# Patient Record
Sex: FEMALE | Race: BLACK OR AFRICAN AMERICAN | Employment: FULL TIME | URBAN - METROPOLITAN AREA
[De-identification: names, ages, dates, MRNs, and addresses within clinical notes are randomized per-mention and may not be internally consistent; named-entity substitution may affect disease eponyms.]

---

## 2017-05-24 ENCOUNTER — HOSPITAL ENCOUNTER (EMERGENCY)
Age: 37
Discharge: HOME | End: 2017-05-24
Attending: EMERGENCY MEDICINE
Payer: SELF-PAY

## 2017-05-24 VITALS
BODY MASS INDEX: 30.53 KG/M2 | RESPIRATION RATE: 15 BRPM | WEIGHT: 190 LBS | TEMPERATURE: 98.6 F | HEART RATE: 92 BPM | DIASTOLIC BLOOD PRESSURE: 80 MMHG | OXYGEN SATURATION: 100 % | HEIGHT: 66 IN | SYSTOLIC BLOOD PRESSURE: 144 MMHG

## 2017-05-24 DIAGNOSIS — R60.0 PEDAL EDEMA: ICD-10-CM

## 2017-05-24 DIAGNOSIS — G89.29 CHRONIC BILATERAL LOW BACK PAIN WITHOUT SCIATICA: Primary | ICD-10-CM

## 2017-05-24 DIAGNOSIS — M54.50 CHRONIC BILATERAL LOW BACK PAIN WITHOUT SCIATICA: Primary | ICD-10-CM

## 2017-05-24 DIAGNOSIS — D64.9 ANEMIA, UNSPECIFIED TYPE: ICD-10-CM

## 2017-05-24 LAB
ANION GAP BLD CALC-SCNC: 18 MMOL/L (ref 10–20)
BUN BLD-MCNC: 9 MG/DL (ref 7–18)
CA-I BLD-MCNC: 1.26 MMOL/L (ref 1.12–1.32)
CHLORIDE BLD-SCNC: 105 MMOL/L (ref 100–108)
CO2 BLD-SCNC: 25 MMOL/L (ref 19–24)
CREAT UR-MCNC: 0.6 MG/DL (ref 0.6–1.3)
GLUCOSE BLD STRIP.AUTO-MCNC: 86 MG/DL (ref 74–106)
HCT VFR BLD CALC: 25 % (ref 36–49)
HGB BLD-MCNC: 8.5 G/DL (ref 12–16)
POTASSIUM BLD-SCNC: 3.4 MMOL/L (ref 3.5–5.5)
SODIUM BLD-SCNC: 143 MMOL/L (ref 136–145)

## 2017-05-24 PROCEDURE — 80047 BASIC METABLC PNL IONIZED CA: CPT

## 2017-05-24 PROCEDURE — 99282 EMERGENCY DEPT VISIT SF MDM: CPT

## 2017-05-24 NOTE — DISCHARGE INSTRUCTIONS
Anemia: Care Instructions  Your Care Instructions    Anemia is a low level of red blood cells, which carry oxygen throughout your body. Many things can cause anemia. Lack of iron is one of the most common causes. Your body needs iron to make hemoglobin, a substance in red blood cells that carries oxygen from the lungs to your body's cells. Without enough iron, the body produces fewer and smaller red blood cells. As a result, your body's cells do not get enough oxygen, and you feel tired and weak. And you may have trouble concentrating. Bleeding is the most common cause of a lack of iron. You may have heavy menstrual bleeding or bleeding caused by conditions such as ulcers, hemorrhoids, or cancer. Regular use of aspirin or other anti-inflammatory medicines (such as ibuprofen) also can cause bleeding in some people. A lack of iron in your diet also can cause anemia, especially at times when the body needs more iron, such as during pregnancy, infancy, and the teen years. Your doctor may have prescribed iron pills. It may take several months of treatment for your iron levels to return to normal. Your doctor also may suggest that you eat foods that are rich in iron, such as meat and beans. There are many other causes of anemia. It is not always due to a lack of iron. Finding the specific cause of your anemia will help your doctor find the right treatment for you. Follow-up care is a key part of your treatment and safety. Be sure to make and go to all appointments, and call your doctor if you are having problems. It's also a good idea to know your test results and keep a list of the medicines you take. How can you care for yourself at home? · Take your medicines exactly as prescribed. Call your doctor if you think you are having a problem with your medicine. · If your doctor recommends iron pills, take them as directed:  ¨ Try to take the pills on an empty stomach about 1 hour before or 2 hours after meals. But you may need to take iron with food to avoid an upset stomach. ¨ Do not take antacids or drink milk or caffeine drinks (such as coffee, tea, or cola) at the same time or within 2 hours of the time that you take your iron. They can make it hard for your body to absorb the iron. ¨ Vitamin C (from food or supplements) helps your body absorb iron. Try taking iron pills with a glass of orange juice or some other food that is high in vitamin C, such as citrus fruits. ¨ Iron pills may cause stomach problems, such as heartburn, nausea, diarrhea, constipation, and cramps. Be sure to drink plenty of fluids, and include fruits, vegetables, and fiber in your diet each day. Iron pills often make your bowel movements dark or green. ¨ If you forget to take an iron pill, do not take a double dose of iron the next time you take a pill. ¨ Keep iron pills out of the reach of small children. An overdose of iron can be very dangerous. · Follow your doctor's advice about eating iron-rich foods. These include red meat, shellfish, poultry, eggs, beans, raisins, whole-grain bread, and leafy green vegetables. · Steam vegetables to help them keep their iron content. When should you call for help? Call 911 anytime you think you may need emergency care. For example, call if:  · You have symptoms of a heart attack. These may include:  ¨ Chest pain or pressure, or a strange feeling in the chest.  ¨ Sweating. ¨ Shortness of breath. ¨ Nausea or vomiting. ¨ Pain, pressure, or a strange feeling in the back, neck, jaw, or upper belly or in one or both shoulders or arms. ¨ Lightheadedness or sudden weakness. ¨ A fast or irregular heartbeat. After you call 911, the  may tell you to chew 1 adult-strength or 2 to 4 low-dose aspirin. Wait for an ambulance. Do not try to drive yourself. · You passed out (lost consciousness).   Call your doctor now or seek immediate medical care if:  · You have new or increased shortness of breath. · You are dizzy or lightheaded, or you feel like you may faint. · Your fatigue and weakness continue or get worse. · You have any abnormal bleeding, such as:  ¨ Nosebleeds. ¨ Vaginal bleeding that is different (heavier, more frequent, at a different time of the month) than what you are used to. ¨ Bloody or black stools, or rectal bleeding. ¨ Bloody or pink urine. Watch closely for changes in your health, and be sure to contact your doctor if:  · You do not get better as expected. Where can you learn more? Go to http://[employer]kervin.info/. Enter R301 in the search box to learn more about \"Anemia: Care Instructions. \"  Current as of: October 13, 2016  Content Version: 11.2  © 2815-8659 [employer]. Care instructions adapted under license by [employer] (which disclaims liability or warranty for this information). If you have questions about a medical condition or this instruction, always ask your healthcare professional. [place] [address] any warranty or liability for your use of this information. Back Pain: Care Instructions  Your Care Instructions    Back pain has many possible causes. It is often related to problems with muscles and ligaments of the back. It may also be related to problems with the nerves, discs, or bones of the back. Moving, lifting, standing, sitting, or sleeping in an awkward way can strain the back. Sometimes you don't notice the injury until later. Arthritis is another common cause of back pain. Although it may hurt a lot, back pain usually improves on its own within several weeks. Most people recover in 12 weeks or less. Using good home treatment and being careful not to stress your back can help you feel better sooner. Follow-up care is a key part of your treatment and safety. Be sure to make and go to all appointments, and call your doctor if you are having problems.  Its also a good idea to know your test results and keep a list of the medicines you take. How can you care for yourself at home? · Sit or lie in positions that are most comfortable and reduce your pain. Try one of these positions when you lie down:  ¨ Lie on your back with your knees bent and supported by large pillows. ¨ Lie on the floor with your legs on the seat of a sofa or chair. Yesenia Ben on your side with your knees and hips bent and a pillow between your legs. ¨ Lie on your stomach if it does not make pain worse. · Do not sit up in bed, and avoid soft couches and twisted positions. Bed rest can help relieve pain at first, but it delays healing. Avoid bed rest after the first day of back pain. · Change positions every 30 minutes. If you must sit for long periods of time, take breaks from sitting. Get up and walk around, or lie in a comfortable position. · Try using a heating pad on a low or medium setting for 15 to 20 minutes every 2 or 3 hours. Try a warm shower in place of one session with the heating pad. · You can also try an ice pack for 10 to 15 minutes every 2 to 3 hours. Put a thin cloth between the ice pack and your skin. · Take pain medicines exactly as directed. ¨ If the doctor gave you a prescription medicine for pain, take it as prescribed. ¨ If you are not taking a prescription pain medicine, ask your doctor if you can take an over-the-counter medicine. · Take short walks several times a day. You can start with 5 to 10 minutes, 3 or 4 times a day, and work up to longer walks. Walk on level surfaces and avoid hills and stairs until your back is better. · Return to work and other activities as soon as you can. Continued rest without activity is usually not good for your back. · To prevent future back pain, do exercises to stretch and strengthen your back and stomach. Learn how to use good posture, safe lifting techniques, and proper body mechanics. When should you call for help?   Call your doctor now or seek immediate medical care if:  · You have new or worsening numbness in your legs. · You have new or worsening weakness in your legs. (This could make it hard to stand up.)  · You lose control of your bladder or bowels. Watch closely for changes in your health, and be sure to contact your doctor if:  · Your pain gets worse. · You are not getting better after 2 weeks. Where can you learn more? Go to http://chiki-kervin.info/. Enter D015 in the search box to learn more about \"Back Pain: Care Instructions. \"  Current as of: May 23, 2016  Content Version: 11.2  © 0626-5338 [employer]. Care instructions adapted under license by [employer] (which disclaims liability or warranty for this information). If you have questions about a medical condition or this instruction, always ask your healthcare professional. [address] any warranty or liability for your use of this information. Back Stretches: Exercises  Your Care Instructions  Here are some examples of exercises for stretching your back. Start each exercise slowly. Ease off the exercise if you start to have pain. Your doctor or physical therapist will tell you when you can start these exercises and which ones will work best for you. How to do the exercises  Overhead stretch    1. Stand comfortably with your feet shoulder-width apart. 2. Looking straight ahead, raise both arms over your head and reach toward the ceiling. Do not allow your head to tilt back. 3. Hold for 15 to 30 seconds, then lower your arms to your sides. 4. Repeat 2 to 4 times. Side stretch    1. Stand comfortably with your feet shoulder-width apart. 2. Raise one arm over your head, and then lean to the other side. 3. Slide your hand down your leg as you let the weight of your arm gently stretch your side muscles. Hold for 15 to 30 seconds. 4. Repeat 2 to 4 times on each side. Press-up    1.  Lie on your stomach, supporting your body with your forearms. 2. Press your elbows down into the floor to raise your upper back. As you do this, relax your stomach muscles and allow your back to arch without using your back muscles. As your press up, do not let your hips or pelvis come off the floor. 3. Hold for 15 to 30 seconds, then relax. 4. Repeat 2 to 4 times. Relax and rest    1. Lie on your back with a rolled towel under your neck and a pillow under your knees. Extend your arms comfortably to your sides. 2. Relax and breathe normally. 3. Remain in this position for about 10 minutes. 4. If you can, do this 2 or 3 times each day. Follow-up care is a key part of your treatment and safety. Be sure to make and go to all appointments, and call your doctor if you are having problems. It's also a good idea to know your test results and keep a list of the medicines you take. Where can you learn more? Go to http://chiki-kervin.info/. Enter D938 in the search box to learn more about \"Back Stretches: Exercises. \"  Current as of: May 23, 2016  Content Version: 11.2  © 0269-9739 [employer]. Care instructions adapted under license by [employer] (which disclaims liability or warranty for this information). If you have questions about a medical condition or this instruction, always ask your healthcare professional. [place] [address] any warranty or liability for your use of this information. Back Pain, Emergency or Urgent Symptoms: Care Instructions  Your Care Instructions  Many people have back pain at one time or another. In most cases, pain gets better with self-care that includes over-the-counter pain medicine, ice, heat, and exercises. Unless you have symptoms of a severe injury or heart attack, you may be able to give yourself a few days before you call a doctor. But some back problems are very serious. Do not ignore symptoms that need to be checked right away.   Follow-up care is a key part of your treatment and safety. Be sure to make and go to all appointments, and call your doctor if you are having problems. It's also a good idea to know your test results and keep a list of the medicines you take. How can you care for yourself at home? · Sit or lie in positions that are most comfortable and that reduce your pain. Try one of these positions when you lie down:  ¨ Lie on your back with your knees bent and supported by large pillows. ¨ Lie on the floor with your legs on the seat of a sofa or chair. Fidel Coral on your side with your knees and hips bent and a pillow between your legs. ¨ Lie on your stomach if it does not make pain worse. · Do not sit up in bed, and avoid soft couches and twisted positions. Bed rest can help relieve pain at first, but it delays healing. Avoid bed rest after the first day. · Change positions every 30 minutes. If you must sit for long periods of time, take breaks from sitting. Get up and walk around, or lie flat. · Try using a heating pad on a low or medium setting, for 15 to 20 minutes every 2 or 3 hours. Try a warm shower in place of one session with the heating pad. You can also buy single-use heat wraps that last up to 8 hours. You can also try ice or cold packs on your back for 10 to 20 minutes at a time, several times a day. (Put a thin cloth between the ice pack and your skin.) This reduces pain and makes it easier to be active and exercise. · Take pain medicines exactly as directed. ¨ If the doctor gave you a prescription medicine for pain, take it as prescribed. ¨ If you are not taking a prescription pain medicine, ask your doctor if you can take an over-the-counter medicine. When should you call for help? Call 911 anytime you think you may need emergency care. For example, call if:  · You are unable to move a leg at all. · You have back pain with severe belly pain. · You have symptoms of a heart attack.  These may include:  ¨ Chest pain or pressure, or a strange feeling in the chest.  ¨ Sweating. ¨ Shortness of breath. ¨ Nausea or vomiting. ¨ Pain, pressure, or a strange feeling in the back, neck, jaw, or upper belly or in one or both shoulders or arms. ¨ Lightheadedness or sudden weakness. ¨ A fast or irregular heartbeat. After you call 911, the  may tell you to chew 1 adult-strength or 2 to 4 low-dose aspirin. Wait for an ambulance. Do not try to drive yourself. Call your doctor now or seek immediate medical care if:  · You have new or worse symptoms in your arms, legs, chest, belly, or buttocks. Symptoms may include:  ¨ Numbness or tingling. ¨ Weakness. ¨ Pain. · You lose bladder or bowel control. · You have back pain and:  ¨ You have injured your back while lifting or doing some other activity. Call if the pain is severe, has not gone away after 1 or 2 days, and you cannot do your normal daily activities. ¨ You have had a back injury before that needed treatment. ¨ Your pain has lasted longer than 4 weeks. ¨ You have had weight loss you cannot explain. ¨ You are age 48 or older. ¨ You have cancer now or have had it before. Watch closely for changes in your health, and be sure to contact your doctor if you are not getting better as expected. Where can you learn more? Go to http://chiki-kervin.info/. Enter D662 in the search box to learn more about \"Back Pain, Emergency or Urgent Symptoms: Care Instructions. \"  Current as of: May 27, 2016  Content Version: 11.2  © 4713-1397 [employer]. Care instructions adapted under license by [employer] (which disclaims liability or warranty for this information). If you have questions about a medical condition or this instruction, always ask your healthcare professional. [address] any warranty or liability for your use of this information.

## 2017-05-24 NOTE — Clinical Note
Drink plenty of fluids. Eat a diet rich in iron supplements. Elevate your feet and legs when possible. Consider wearing compression socks during the day when you are standing for prolonged periods of time. Have your blood pressure rechecked by a [facility] doctor this week.

## 2017-05-24 NOTE — ED PROVIDER NOTES
HPI Comments: Pt with no significant medical history, presents to ED with complaint of low back pain for the past 2 years and intermittent bilateral pedal edema for the past year with occasional swelling and \"my feet feel numb\". She denies any history of DM, no history of neuropathy, no weakness, no change in gait, no swelling to lower extremities, no dyspnea, no wheezing, no cough. She does not have a PMD and hasn't previously sought medical care for her chronic complaints. She states that her feet were too numb to work today which is what prompted her to come to the ED. No other acute symptoms or complaints were noted. Past Medical History:   Diagnosis Date    Anemia        History reviewed. No pertinent surgical history. History reviewed. No pertinent family history. Social History     Social History    Marital status:      Spouse name: N/A    Number of children: N/A    Years of education: N/A     Occupational History    Not on file. Social History Main Topics    Smoking status: Never Smoker    Smokeless tobacco: Not on file    Alcohol use No    Drug use: No    Sexual activity: Not on file     Other Topics Concern    Not on file     Social History Narrative         ALLERGIES: Review of patient's allergies indicates no known allergies. Review of Systems   Constitutional: Negative for chills, diaphoresis and fever. HENT: Negative. Eyes: Negative for visual disturbance. Respiratory: Negative for chest tightness and shortness of breath. Cardiovascular: Negative for chest pain, palpitations and leg swelling. Gastrointestinal: Negative for abdominal pain, nausea and vomiting. Endocrine: Negative. Genitourinary: Negative for dysuria and hematuria. Musculoskeletal: Positive for back pain. Negative for gait problem, neck pain and neck stiffness. Skin: Negative for pallor and wound. Allergic/Immunologic: Negative. Neurological: Positive for numbness. Negative for dizziness, syncope, weakness, light-headedness and headaches. Hematological: Does not bruise/bleed easily. Vitals:    05/24/17 1723   BP: 144/80   Pulse: 92   Resp: 15   Temp: 98.6 °F (37 °C)   SpO2: 100%   Weight: 86.2 kg (190 lb)   Height: 5' 6\" (1.676 m)            Physical Exam   Constitutional: She is oriented to person, place, and time. She appears well-developed and well-nourished. No distress. Resting comfortably on stretcher   HENT:   Head: Normocephalic and atraumatic. MM moist   Eyes: Conjunctivae and EOM are normal. No scleral icterus. Sclera clear bilaterally   Neck: Normal range of motion. Neck supple. No JVD present. Non-tender to palpation   Cardiovascular: Normal rate, regular rhythm and normal heart sounds. Exam reveals no gallop and no friction rub. No murmur heard. Pulmonary/Chest: Effort normal and breath sounds normal. No respiratory distress. She has no wheezes. She has no rales. She exhibits no tenderness. No crepitance with palpation   Abdominal: Soft. Bowel sounds are normal. She exhibits no distension. There is no tenderness. Genitourinary:   Genitourinary Comments: No CVA tenderness   Musculoskeletal: She exhibits no tenderness. Normal inspection of upper extremities. Soft, non-pitting edema to bilateral ankles, no lower extremity pain or swelling. Lymphadenopathy:     She has no cervical adenopathy. Neurological: She is alert and oriented to person, place, and time. No cranial nerve deficit. She exhibits normal muscle tone. Normal motor and sensation bilaterally to upper and lower extremities   Skin: Skin is warm and dry. She is not diaphoretic. Psychiatric:   Normal mood and affect. Vitals reviewed. MDM  Number of Diagnoses or Management Options  Diagnosis management comments: Pt with chronic complaints, benign exam and reassuring VS.  Will check POC chem, consult  and discharge to home with PMD referrals.     ED Course Procedures

## 2017-05-30 ENCOUNTER — HOSPITAL ENCOUNTER (EMERGENCY)
Age: 37
Discharge: LEFT BEFORE DISCHARGE | End: 2017-05-30
Attending: EMERGENCY MEDICINE
Payer: SELF-PAY

## 2017-05-30 VITALS
TEMPERATURE: 98.6 F | RESPIRATION RATE: 20 BRPM | BODY MASS INDEX: 30.53 KG/M2 | WEIGHT: 190 LBS | OXYGEN SATURATION: 100 % | SYSTOLIC BLOOD PRESSURE: 130 MMHG | HEART RATE: 102 BPM | HEIGHT: 66 IN | DIASTOLIC BLOOD PRESSURE: 88 MMHG

## 2017-05-30 DIAGNOSIS — Z53.21 PATIENT LEFT WITHOUT BEING SEEN: Primary | ICD-10-CM

## 2017-05-30 PROCEDURE — 75810000275 HC EMERGENCY DEPT VISIT NO LEVEL OF CARE

## 2017-05-31 NOTE — ED PROVIDER NOTES
HPI Comments: Pt left without being seen after triage. Patient is a 40 y.o. female presenting with abdominal pain, vomiting, and dizziness. Abdominal Pain    Associated symptoms include vomiting. Vomiting    Associated symptoms include abdominal pain. Dizziness    Associated symptoms include abdominal pain, vomiting and dizziness. Past Medical History:   Diagnosis Date    Anemia        History reviewed. No pertinent surgical history. History reviewed. No pertinent family history. Social History     Social History    Marital status:      Spouse name: N/A    Number of children: N/A    Years of education: N/A     Occupational History    Not on file. Social History Main Topics    Smoking status: Never Smoker    Smokeless tobacco: Not on file    Alcohol use No    Drug use: No    Sexual activity: Not on file     Other Topics Concern    Not on file     Social History Narrative         ALLERGIES: Review of patient's allergies indicates no known allergies. Review of Systems   Gastrointestinal: Positive for abdominal pain and vomiting. Neurological: Positive for dizziness.        Vitals:    05/30/17 1855   BP: 130/88   Pulse: (!) 102   Resp: 20   Temp: 98.6 °F (37 °C)   SpO2: 100%   Weight: 86.2 kg (190 lb)   Height: 5' 6\" (1.676 m)            Physical Exam     MDM  ED Course       Procedures

## 2018-03-11 ENCOUNTER — HOSPITAL ENCOUNTER (INPATIENT)
Age: 38
LOS: 1 days | Discharge: HOME | DRG: 812 | End: 2018-03-12
Attending: EMERGENCY MEDICINE | Admitting: INTERNAL MEDICINE
Payer: SELF-PAY

## 2018-03-11 ENCOUNTER — APPOINTMENT (OUTPATIENT)
Dept: ULTRASOUND IMAGING | Age: 38
DRG: 812 | End: 2018-03-11
Attending: EMERGENCY MEDICINE
Payer: SELF-PAY

## 2018-03-11 DIAGNOSIS — R51.9 ACUTE NONINTRACTABLE HEADACHE, UNSPECIFIED HEADACHE TYPE: ICD-10-CM

## 2018-03-11 DIAGNOSIS — G89.29 CHRONIC BILATERAL LOW BACK PAIN WITHOUT SCIATICA: ICD-10-CM

## 2018-03-11 DIAGNOSIS — M54.50 CHRONIC BILATERAL LOW BACK PAIN WITHOUT SCIATICA: ICD-10-CM

## 2018-03-11 DIAGNOSIS — R42 DIZZINESS: Primary | ICD-10-CM

## 2018-03-11 DIAGNOSIS — D64.9 SYMPTOMATIC ANEMIA: ICD-10-CM

## 2018-03-11 PROBLEM — N92.0 MENORRHAGIA WITH REGULAR CYCLE: Status: ACTIVE | Noted: 2018-03-11

## 2018-03-11 LAB
ANION GAP SERPL CALC-SCNC: 5 MMOL/L (ref 3–18)
ATRIAL RATE: 71 BPM
BASOPHILS # BLD: 0 K/UL (ref 0–0.06)
BASOPHILS NFR BLD: 1 % (ref 0–2)
BUN SERPL-MCNC: 9 MG/DL (ref 7–18)
BUN/CREAT SERPL: 14 (ref 12–20)
CALCIUM SERPL-MCNC: 8.9 MG/DL (ref 8.5–10.1)
CALCULATED P AXIS, ECG09: 54 DEGREES
CALCULATED R AXIS, ECG10: 39 DEGREES
CALCULATED T AXIS, ECG11: 21 DEGREES
CHLORIDE SERPL-SCNC: 112 MMOL/L (ref 100–108)
CK MB CFR SERPL CALC: NORMAL % (ref 0–4)
CK MB SERPL-MCNC: <1 NG/ML (ref 5–25)
CK SERPL-CCNC: 77 U/L (ref 26–192)
CO2 SERPL-SCNC: 24 MMOL/L (ref 21–32)
CREAT SERPL-MCNC: 0.65 MG/DL (ref 0.6–1.3)
DIAGNOSIS, 93000: NORMAL
DIFFERENTIAL METHOD BLD: ABNORMAL
EOSINOPHIL # BLD: 0.1 K/UL (ref 0–0.4)
EOSINOPHIL NFR BLD: 1 % (ref 0–5)
ERYTHROCYTE [DISTWIDTH] IN BLOOD BY AUTOMATED COUNT: 21.8 % (ref 11.6–14.5)
GLUCOSE SERPL-MCNC: 85 MG/DL (ref 74–99)
HCT VFR BLD AUTO: 22.4 % (ref 35–45)
HGB BLD-MCNC: 6.9 G/DL (ref 12–16)
LYMPHOCYTES # BLD: 2.1 K/UL (ref 0.9–3.6)
LYMPHOCYTES NFR BLD: 34 % (ref 21–52)
MCH RBC QN AUTO: 16.2 PG (ref 24–34)
MCHC RBC AUTO-ENTMCNC: 30.8 G/DL (ref 31–37)
MCV RBC AUTO: 52.7 FL (ref 74–97)
MONOCYTES # BLD: 0.7 K/UL (ref 0.05–1.2)
MONOCYTES NFR BLD: 12 % (ref 3–10)
NEUTS SEG # BLD: 3.4 K/UL (ref 1.8–8)
NEUTS SEG NFR BLD: 52 % (ref 40–73)
P-R INTERVAL, ECG05: 164 MS
PLATELET # BLD AUTO: 379 K/UL (ref 135–420)
POTASSIUM SERPL-SCNC: 3.9 MMOL/L (ref 3.5–5.5)
Q-T INTERVAL, ECG07: 398 MS
QRS DURATION, ECG06: 80 MS
QTC CALCULATION (BEZET), ECG08: 432 MS
RBC # BLD AUTO: 4.25 M/UL (ref 4.2–5.3)
SODIUM SERPL-SCNC: 141 MMOL/L (ref 136–145)
TROPONIN I SERPL-MCNC: <0.02 NG/ML (ref 0–0.04)
VENTRICULAR RATE, ECG03: 71 BPM
WBC # BLD AUTO: 6.4 K/UL (ref 4.6–13.2)

## 2018-03-11 PROCEDURE — 36430 TRANSFUSION BLD/BLD COMPNT: CPT

## 2018-03-11 PROCEDURE — 99283 EMERGENCY DEPT VISIT LOW MDM: CPT

## 2018-03-11 PROCEDURE — 30233N1 TRANSFUSION OF NONAUTOLOGOUS RED BLOOD CELLS INTO PERIPHERAL VEIN, PERCUTANEOUS APPROACH: ICD-10-PCS | Performed by: INTERNAL MEDICINE

## 2018-03-11 PROCEDURE — P9016 RBC LEUKOCYTES REDUCED: HCPCS | Performed by: EMERGENCY MEDICINE

## 2018-03-11 PROCEDURE — 74011250637 HC RX REV CODE- 250/637: Performed by: INTERNAL MEDICINE

## 2018-03-11 PROCEDURE — 36415 COLL VENOUS BLD VENIPUNCTURE: CPT | Performed by: EMERGENCY MEDICINE

## 2018-03-11 PROCEDURE — 74011250636 HC RX REV CODE- 250/636: Performed by: EMERGENCY MEDICINE

## 2018-03-11 PROCEDURE — 86900 BLOOD TYPING SEROLOGIC ABO: CPT | Performed by: EMERGENCY MEDICINE

## 2018-03-11 PROCEDURE — 82550 ASSAY OF CK (CPK): CPT | Performed by: EMERGENCY MEDICINE

## 2018-03-11 PROCEDURE — 85027 COMPLETE CBC AUTOMATED: CPT | Performed by: EMERGENCY MEDICINE

## 2018-03-11 PROCEDURE — 80048 BASIC METABOLIC PNL TOTAL CA: CPT | Performed by: EMERGENCY MEDICINE

## 2018-03-11 PROCEDURE — 86920 COMPATIBILITY TEST SPIN: CPT | Performed by: EMERGENCY MEDICINE

## 2018-03-11 PROCEDURE — 93005 ELECTROCARDIOGRAM TRACING: CPT

## 2018-03-11 PROCEDURE — 99218 HC RM OBSERVATION: CPT

## 2018-03-11 PROCEDURE — 74011250636 HC RX REV CODE- 250/636: Performed by: INTERNAL MEDICINE

## 2018-03-11 PROCEDURE — 76830 TRANSVAGINAL US NON-OB: CPT

## 2018-03-11 RX ORDER — LANOLIN ALCOHOL/MO/W.PET/CERES
1 CREAM (GRAM) TOPICAL
Status: DISCONTINUED | OUTPATIENT
Start: 2018-03-12 | End: 2018-03-12 | Stop reason: HOSPADM

## 2018-03-11 RX ORDER — KETOROLAC TROMETHAMINE 30 MG/ML
30 INJECTION, SOLUTION INTRAMUSCULAR; INTRAVENOUS
Status: DISCONTINUED | OUTPATIENT
Start: 2018-03-11 | End: 2018-03-11

## 2018-03-11 RX ORDER — ONDANSETRON 2 MG/ML
4 INJECTION INTRAMUSCULAR; INTRAVENOUS
Status: DISCONTINUED | OUTPATIENT
Start: 2018-03-11 | End: 2018-03-12 | Stop reason: HOSPADM

## 2018-03-11 RX ORDER — DOCUSATE SODIUM 100 MG/1
100 CAPSULE, LIQUID FILLED ORAL
Status: DISCONTINUED | OUTPATIENT
Start: 2018-03-11 | End: 2018-03-12 | Stop reason: HOSPADM

## 2018-03-11 RX ORDER — NALOXONE HYDROCHLORIDE 0.4 MG/ML
0.4 INJECTION, SOLUTION INTRAMUSCULAR; INTRAVENOUS; SUBCUTANEOUS AS NEEDED
Status: DISCONTINUED | OUTPATIENT
Start: 2018-03-11 | End: 2018-03-12 | Stop reason: HOSPADM

## 2018-03-11 RX ORDER — ACETAMINOPHEN 325 MG/1
650 TABLET ORAL
Status: DISCONTINUED | OUTPATIENT
Start: 2018-03-11 | End: 2018-03-12 | Stop reason: HOSPADM

## 2018-03-11 RX ORDER — SODIUM CHLORIDE 9 MG/ML
150 INJECTION, SOLUTION INTRAVENOUS CONTINUOUS
Status: DISCONTINUED | OUTPATIENT
Start: 2018-03-11 | End: 2018-03-12 | Stop reason: HOSPADM

## 2018-03-11 RX ORDER — SODIUM CHLORIDE 9 MG/ML
250 INJECTION, SOLUTION INTRAVENOUS AS NEEDED
Status: DISCONTINUED | OUTPATIENT
Start: 2018-03-11 | End: 2018-03-12 | Stop reason: HOSPADM

## 2018-03-11 RX ORDER — OXYCODONE AND ACETAMINOPHEN 5; 325 MG/1; MG/1
1 TABLET ORAL
Status: DISCONTINUED | OUTPATIENT
Start: 2018-03-11 | End: 2018-03-12 | Stop reason: HOSPADM

## 2018-03-11 RX ADMIN — SODIUM CHLORIDE 150 ML/HR: 900 INJECTION, SOLUTION INTRAVENOUS at 20:44

## 2018-03-11 RX ADMIN — SODIUM CHLORIDE 150 ML/HR: 900 INJECTION, SOLUTION INTRAVENOUS at 19:13

## 2018-03-11 RX ADMIN — ACETAMINOPHEN 650 MG: 325 TABLET ORAL at 20:50

## 2018-03-11 RX ADMIN — SODIUM CHLORIDE 1000 ML: 900 INJECTION, SOLUTION INTRAVENOUS at 19:30

## 2018-03-11 NOTE — IP AVS SNAPSHOT
[address] Patient: Karin Saldivar MRN: [id number] WNS:[birth year]05 About your hospitalization You were admitted on:  March 11, 2018 You last received care in the:  [address] [place] [address] You were discharged on:  March 12, 2018 Why you were hospitalized Your primary diagnosis was:  Symptomatic Anemia Your diagnoses also included:  Menorrhagia With Regular Cycle Follow-up Information Follow up With Details Comments Contact Info KINSEY Cano On 3/20/2018 @1000am, come in 30 minutes earlier, and bring id, proof of income, list of medications. [address] [place] [address] [address] 200 [facility] 
[phone] Your Scheduled Appointments Tuesday March 20, 2018 10:30 AM EDT New Patient with KINSEY Cano [address] [place] [address] ([facility]) [address] [place] [address] [address] 200 [facility]  
[phone] Discharge Orders None A check priyanka indicates which time of day the medication should be taken. My Medications START taking these medications Instructions Each Dose to Equal  
 Morning Noon Evening Bedtime  
 ferrous sulfate 325 mg (65 mg iron) tablet Start taking on:  3/13/2018 Your last dose was: Your next dose is: Take 1 Tab by mouth daily (with breakfast). 325 mg Where to Get Your Medications Information on where to get these meds will be given to you by the nurse or doctor. ! Ask your nurse or doctor about these medications  
  ferrous sulfate 325 mg (65 mg iron) tablet Discharge Instructions Anemia: Care Instructions Your Care Instructions Anemia is a low level of red blood cells, which carry oxygen throughout your body. Many things can cause anemia.  Lack of iron is one of the most common causes. Your body needs iron to make hemoglobin, a substance in red blood cells that carries oxygen from the lungs to your body's cells. Without enough iron, the body produces fewer and smaller red blood cells. As a result, your body's cells do not get enough oxygen, and you feel tired and weak. And you may have trouble concentrating. Bleeding is the most common cause of a lack of iron. You may have heavy menstrual bleeding or bleeding caused by conditions such as ulcers, hemorrhoids, or cancer. Regular use of aspirin or other anti-inflammatory medicines (such as ibuprofen) also can cause bleeding in some people. A lack of iron in your diet also can cause anemia, especially at times when the body needs more iron, such as during pregnancy, infancy, and the teen years. Your doctor may have prescribed iron pills. It may take several months of treatment for your iron levels to return to normal. Your doctor also may suggest that you eat foods that are rich in iron, such as meat and beans. There are many other causes of anemia. It is not always due to a lack of iron. Finding the specific cause of your anemia will help your doctor find the right treatment for you. Follow-up care is a key part of your treatment and safety. Be sure to make and go to all appointments, and call your doctor if you are having problems. It's also a good idea to know your test results and keep a list of the medicines you take. How can you care for yourself at home? · Take your medicines exactly as prescribed. Call your doctor if you think you are having a problem with your medicine. · If your doctor recommends iron pills, take them as directed: ¨ Try to take the pills on an empty stomach about 1 hour before or 2 hours after meals. But you may need to take iron with food to avoid an upset stomach.  
¨ Do not take antacids or drink milk or caffeine drinks (such as coffee, tea, or cola) at the same time or within 2 hours of the time that you take your iron. They can make it hard for your body to absorb the iron. ¨ Vitamin C (from food or supplements) helps your body absorb iron. Try taking iron pills with a glass of orange juice or some other food that is high in vitamin C, such as citrus fruits. ¨ Iron pills may cause stomach problems, such as heartburn, nausea, diarrhea, constipation, and cramps. Be sure to drink plenty of fluids, and include fruits, vegetables, and fiber in your diet each day. Iron pills often make your bowel movements dark or green. ¨ If you forget to take an iron pill, do not take a double dose of iron the next time you take a pill. ¨ Keep iron pills out of the reach of small children. An overdose of iron can be very dangerous. · Follow your doctor's advice about eating iron-rich foods. These include red meat, shellfish, poultry, eggs, beans, raisins, whole-grain bread, and leafy green vegetables. · Steam vegetables to help them keep their iron content. When should you call for help? Call 911 anytime you think you may need emergency care. For example, call if: 
? · You have symptoms of a heart attack. These may include: ¨ Chest pain or pressure, or a strange feeling in the chest. 
¨ Sweating. ¨ Shortness of breath. ¨ Nausea or vomiting. ¨ Pain, pressure, or a strange feeling in the back, neck, jaw, or upper belly or in one or both shoulders or arms. ¨ Lightheadedness or sudden weakness. ¨ A fast or irregular heartbeat. After you call 911, the  may tell you to chew 1 adult-strength or 2 to 4 low-dose aspirin. Wait for an ambulance. Do not try to drive yourself. ? · You passed out (lost consciousness). ?Call your doctor now or seek immediate medical care if: 
? · You have new or increased shortness of breath. ? · You are dizzy or lightheaded, or you feel like you may faint. ? · Your fatigue and weakness continue or get worse. ? · You have any abnormal bleeding, such as: 
¨ Nosebleeds. ¨ Vaginal bleeding that is different (heavier, more frequent, at a different time of the month) than what you are used to. ¨ Bloody or black stools, or rectal bleeding. ¨ Bloody or pink urine. ? Watch closely for changes in your health, and be sure to contact your doctor if: 
? · You do not get better as expected. Where can you learn more? Go to http://chiki-kervin.info/. Enter R301 in the search box to learn more about \"Anemia: Care Instructions. \" Current as of: October 13, 2016 Content Version: 11.4 © 0356-0656 delicious. Care instructions adapted under license by [employer] (which disclaims liability or warranty for this information). If you have questions about a medical condition or this instruction, always ask your healthcare professional. [place] [address] any warranty or liability for your use of this information. Abnormal Uterine Bleeding in Teens: Care Instructions Your Care Instructions Abnormal uterine bleeding (AUB) is irregular bleeding from the uterus that is longer or heavier than usual or does not occur at your regular time. Sometimes it's because of changes in hormone levels or growths in the uterus such as fibroids or polyps. Sometimes a cause cannot be found. You may have heavy bleeding when you are not expecting your period. Your doctor may suggest a pregnancy test, if you think you are pregnant. Follow-up care is a key part of your treatment and safety. Be sure to make and go to all appointments, and call your doctor if you are having problems. It's also a good idea to know your test results and keep a list of the medicines you take. How can you care for yourself at home? · Be safe with medicines. Read and follow all instructions on the label. ¨ If the doctor gave you a prescription medicine for pain, take it as prescribed. ¨ If you are not taking a prescription pain medicine, ask your doctor if you can take an over-the-counter medicine. · You may be low in iron because of blood loss. Eat a balanced diet that is high in iron and vitamin C. Foods with a lot of iron include red meat, shellfish, and eggs. They also include beans and leafy green vegetables. Talk to your doctor about taking iron pills or a multivitamin. When should you call for help? Call 911 anytime you think you may need emergency care. For example, call if: 
? · You passed out (lost consciousness). ?Call your doctor now or seek immediate medical care if: 
? · You have new or worse belly or pelvic pain. ? · You are dizzy or lightheaded, or you feel like you may faint. ? · You have severe vaginal bleeding. ? Watch closely for changes in your health, and be sure to contact your doctor if: 
? · You think you may be pregnant. ? · Your bleeding gets worse. ? · You do not get better as expected. Where can you learn more? Go to http://chiki-kervin.info/. Enter Sarah Hernandez in the search box to learn more about \"Abnormal Uterine Bleeding in Teens: Care Instructions. \" Current as of: October 13, 2016 Content Version: 11.4 © 1281-6169 [employer]. Care instructions adapted under license by [employer] (which disclaims liability or warranty for this information). If you have questions about a medical condition or this instruction, always ask your healthcare professional. [place] [address] any warranty or liability for your use of this information. Abnormal Uterine Bleeding: Care Instructions Your Care Instructions Abnormal uterine bleeding (AUB) is irregular bleeding from the uterus that is longer or heavier than usual or does not occur at your regular time. Sometimes it is caused by changes in hormone levels.  It can also be caused by growths in the uterus, such as fibroids or polyps. Sometimes a cause cannot be found. You may have heavy bleeding when you are not expecting your period. Your doctor may suggest a pregnancy test, if you think you are pregnant. Follow-up care is a key part of your treatment and safety. Be sure to make and go to all appointments, and call your doctor if you are having problems. It's also a good idea to know your test results and keep a list of the medicines you take. How can you care for yourself at home? · Be safe with medicines. Take pain medicines exactly as directed. ¨ If the doctor gave you a prescription medicine for pain, take it as prescribed. ¨ If you are not taking a prescription pain medicine, ask your doctor if you can take an over-the-counter medicine. · You may be low in iron because of blood loss. Eat a balanced diet that is high in iron and vitamin C. Foods rich in iron include red meat, shellfish, eggs, beans, and leafy green vegetables. Talk to your doctor about whether you need to take iron pills or a multivitamin. When should you call for help? Call 911 anytime you think you may need emergency care. For example, call if: 
? · You passed out (lost consciousness). ?Call your doctor now or seek immediate medical care if: 
? · You have new or worse belly or pelvic pain. ? · You have severe vaginal bleeding. ? · You feel dizzy or lightheaded, or you feel like you may faint. ? Watch closely for changes in your health, and be sure to contact your doctor if: 
? · You think you may be pregnant. ? · Your bleeding gets worse. ? · You do not get better as expected. Where can you learn more? Go to http://chiki-kervin.info/. Enter S363 in the search box to learn more about \"Abnormal Uterine Bleeding: Care Instructions. \" Current as of: October 13, 2016 Content Version: 11.4 © 0708-7194 [employer].  Care instructions adapted under license by Adama Monahan (which disclaims liability or warranty for this information). If you have questions about a medical condition or this instruction, always ask your healthcare professional. [address] any warranty or liability for your use of this information. [employer] Announcement We are excited to announce that we are making your provider's discharge notes available to you in [employer]. You will see these notes when they are completed and signed by the physician that discharged you from your recent hospital stay. If you have any questions or concerns about any information you see in [employer], please call the Health Information Department where you were seen or reach out to your Primary Care Provider for more information about your plan of care. Introducing [facility]! Gisele Baxter introduces [employer] patient portal. Now you can access parts of your medical record, email your doctor's office, and request medication refills online. 1. In your internet browser, go to [email or web address] 2. Click on the First Time User? Click Here link in the Sign In box. You will see the New Member Sign Up page. 3. Enter your [employer] Access Code exactly as it appears below. You will not need to use this code after youve completed the sign-up process. If you do not sign up before the expiration date, you must request a new code. · [employer] Access Code: [email or web address] Expires: 6/10/2018 11:36 AM 
 
4. Enter the last four digits of your Social Security Number (xxxx) and Date of Birth (mm/dd/yyyy) as indicated and click Submit. You will be taken to the next sign-up page. 5. Create a [employer] ID. This will be your [employer] login ID and cannot be changed, so think of one that is secure and easy to remember. 6. Create a [employer] password. You can change your password at any time. 7. Enter your Password Reset Question and Answer. This can be used at a later time if you forget your password. 8. Enter your e-mail address. You will receive e-mail notification when new information is available in [address] 9. Click Sign Up. You can now view and download portions of your medical record. 10. Click the Download Summary menu link to download a portable copy of your medical information. If you have questions, please visit the Frequently Asked Questions section of the [employer] website. Remember, [employer] is NOT to be used for urgent needs. For medical emergencies, dial 911. Now available from your iPhone and Android! Providers Seen During Your Hospitalization Provider Specialty Primary office phone Mckenzie Bowman MD Emergency Medicine [phone] Davey Maher MD Hospitalist [phone] Layla Zurita MD Internal Medicine [phone] Immunizations Administered for This Admission Name Date Influenza Vaccine (Quad) PF 3/12/2018 Your Primary Care Physician (PCP) Primary Care Physician Office Phone Office Fax NONE ** None ** ** None ** You are allergic to the following No active allergies Recent Documentation Weight BMI OB Status Smoking Status 95 kg 33.81 kg/m2 Having regular periods Never Smoker Emergency Contacts Name Discharge Info Relation Home Work Mobile [address] CAREGIVER [3] Spouse [3] [phone] Patient Belongings The following personal items are in your possession at time of discharge: 
  Dental Appliances: None         Home Medications: None   Jewelry: None  Clothing: At bedside, Jacket/Coat, Pants, Shirt, Socks, Undergarments    Other Valuables: Diego Vergara Please provide this summary of care documentation to your next provider.  
  
  
 
  
Signatures-by signing, you are acknowledging that this After Visit Summary has been reviewed with you and you have received a copy. Patient Signature:  ____________________________________________________________ Date:  ____________________________________________________________  
  
Merleen Spire Provider Signature:  ____________________________________________________________ Date:  ____________________________________________________________

## 2018-03-11 NOTE — H&P
History & Physical    Patient: Alona Martel MRN: [id number]  CSN: [id number]    YOB: 1980  Age: 41 y.o. Sex: female      DOA: 3/11/2018    Chief Complaint:   Chief Complaint   Patient presents with    Back Pain    Dizziness    Blurred Vision          HPI:     Alona Martel is a 41 y.o.  female who has PMH of anemia and heavy menses presented to ER with a CC of generalized weakness, dizziness and SOB for the last 3 months that got progressively worse in the last few days. Pt reports heavy menses since menarche but did not seek medical care. Her cycles ar regular but last 7 days with heavy bleeding in the first 5 days. On admission Pt was found to have low H/H and on her last 2 days of her cycle with mild bleeding. Denies abdominal pain except for regular menstrual cramps. No CP but feels general weak and has no energy    Past Medical History:   Diagnosis Date    Anemia        No past surgical history on file. No family history on file. Social History     Social History    Marital status:      Spouse name: N/A    Number of children: N/A    Years of education: N/A     Social History Main Topics    Smoking status: Never Smoker    Smokeless tobacco: None    Alcohol use No    Drug use: No    Sexual activity: Not Asked     Other Topics Concern    None     Social History Narrative       Prior to Admission medications    Not on File       No Known Allergies      Review of Systems  GENERAL: Patient alert, awake and oriented times 3, able to communicate full sentences and not in distress at rest  HEENT: No change in vision, no earache, tinnitus, sore throat or sinus congestion. NECK: No pain or stiffness. PULMONARY: No shortness of breath, cough or wheeze. Cardiovascular: no pnd / orthopnea, no CP  GASTROINTESTINAL: No abdominal pain, nausea, vomiting or diarrhea, melena or bright red blood per rectum.    GENITOURINARY: No urinary frequency, urgency, hesitancy or dysuria. MUSCULOSKELETAL: No joint or muscle pain, no back pain, no recent trauma. DERMATOLOGIC: No rash, no itching, no lesions. ENDOCRINE: No polyuria, polydipsia, no heat or cold intolerance. No recent change in weight. HEMATOLOGICAL: +ve anemia. No easy bruising or bleeding. NEUROLOGIC: No headache, seizures, +ve for numbness, tingling and  weakness. Physical Exam:     Physical Exam:  Visit Vitals    /69    Pulse 70    Temp 97 °F (36.1 °C)    Resp 16    Wt 86.2 kg (190 lb)    SpO2 100%    BMI 30.67 kg/m2           Temp (24hrs), Av °F (36.7 °C), Min:97 °F (36.1 °C), Max:99.2 °F (37.3 °C)             General:  Alert, cooperative, no distress, appears stated age. Head: Normocephalic, without obvious abnormality, atraumatic. Eyes:  Conjunctivae/corneas clear. PERRL, EOMs intact. Nose: Nares normal. No drainage or sinus tenderness. Neck: Supple, symmetrical, trachea midline, no adenopathy, thyroid: no enlargement, no carotid bruit and no JVD. Lungs:   Clear to auscultation bilaterally. Heart:  Regular rate and rhythm, S1, S2 normal.     Abdomen: Soft, non-tender. Bowel sounds normal.    Extremities: Extremities normal, atraumatic, no cyanosis or edema. Pulses: 2+ and symmetric all extremities. Skin:  No rashes or lesions   Neurologic: AAOx3, No focal motor or sensory deficit. Labs Reviewed: All lab results for the last 24 hours reviewed.   Transvaginal US and EKG    Procedures/imaging: see electronic medical records for all procedures/Xrays and details which were not copied into this note but were reviewed prior to creation of Plan      Assessment/Plan     Principal Problem:    Symptomatic anemia (3/11/2018)    Active Problems:    Menorrhagia with regular cycle (3/11/2018)       Pt is admitted to observation for Symptomatic anemia most likely 2 ry to menorrhagia r/o other etiology    Transfusing 2 units of PRBCs  Monitor H&H  Will get Iron profile and ferritin  B12 and folate levels  TSH and retics count    Hx of Menorrhagia and anteverted uterus on US>> GYN consult is appreciated. Pt will f/u as an OP for further work up if needed.     DVT/GI Prophylaxis: SCD's    Plan of care is discussed in details with Patient/Family at bedside and agreed upon    Dave Nash MD  3/12/2018 5:53 PM

## 2018-03-11 NOTE — ED NOTES
Patient refusing to be stuck again. She did have an IV that infiltrated. She was stuck x3 and is refusing to have another stick.

## 2018-03-11 NOTE — ED PROVIDER NOTES
[facility] EMERGENCY DEPT      12:49 PM    Date: 3/11/2018  Patient Name: Jarod Beckman    History of Presenting Illness     Chief Complaint   Patient presents with    Back Pain    Dizziness    Blurred Vision       History Provided By: Patient    Chief Complaint: dizziness  Duration:  Days  Timing:  Intermittent  Location: n/a  Quality: n/a  Severity: Moderate  Modifying Factors: Worsened when standing/walking. Associated Symptoms: back pain, blurred vision, HA, weight loss, decreased appetite    41 y.o. female with noted past medical history of anemia who presents to the emergency department with intermittent dizziness onset 1 week ago. Associated Sx include back pain, intermittent HA, unexplained weight loss, decreased appetite, and visual disturbance. The pt reports that the dizziness only occurs when she is upright, walking or standing. The pt states that she experienced an episode of blurred vision yesterday, along with an episode of dizziness while at work. Reports that the HA is located in her forehead. States that the back pain is chronic and located in her lower back, but radiates to her neck. Denies any alcohol use, tobacco use, congestion, chance of pregnancy, or rhinorrhea. Denies any other modifying factors. No further complaints at this time. Nursing notes regarding the HPI and triage nursing notes were reviewed. Prior medical records were reviewed. Past History     Past Medical History:  Past Medical History:   Diagnosis Date    Anemia        Past Surgical History:  No past surgical history on file. Family History:  No family history on file.     Social History:  Social History   Substance Use Topics    Smoking status: Never Smoker    Smokeless tobacco: Not on file    Alcohol use No       Allergies:  No Known Allergies    Patient's primary care provider (as noted in EPIC):  None    Review of Systems   Review of Systems   Constitutional: Positive for appetite change and unexpected weight change. Negative for diaphoresis. HENT: Negative for congestion and rhinorrhea. Eyes: Positive for visual disturbance. Negative for discharge. Respiratory: Negative for stridor. Cardiovascular: Negative for palpitations. Gastrointestinal: Negative for diarrhea. Genitourinary: Negative for flank pain. Musculoskeletal: Positive for back pain. Neurological: Positive for dizziness and headaches. Negative for weakness. Psychiatric/Behavioral: Negative for hallucinations. All other systems reviewed and are negative. Physical Exam       Visit Vitals    /83    Pulse 79    Temp 99.2 °F (37.3 °C)    Resp 16    Wt 86.2 kg (190 lb)    SpO2 100%    BMI 30.67 kg/m2       PHYSICAL EXAM:    CONSTITUTIONAL:  Alert, in no apparent distress;  well developed;  well nourished. HEAD:  Normocephalic, atraumatic. EYES:  EOMI. Non-icteric sclera. Normal conjunctiva. ENTM:  Nose:  no rhinorrhea. Throat:  no erythema or exudate, mucous membranes moist.  NECK:  No JVD. Supple  RESPIRATORY:  Chest clear, equal breath sounds, good air movement. CARDIOVASCULAR:  Regular rate and rhythm. No murmurs, rubs, or gallops. GI:  Normal bowel sounds, abdomen soft and non-tender. No rebound or guarding. Rectal exam: Good rectal tone, Hemoccult negative. BACK:  Non-tender. UPPER EXT:  Normal inspection. LOWER EXT:  No edema, no calf tenderness. Distal pulses intact. NEURO:  Moves all four extremities. Normal motor exam and sensation in all four extremities. Normal CN II-XII exam.  Normal bilateral finger-to-nose exam.      SKIN:  No rashes;  Normal for age. PSYCH:  Alert and normal affect.     DIFFERENTIAL DIAGNOSES/ MEDICAL DECISION MAKING:   Dehydration, hyperglycemia-induced weakness, electrolyte and/or endocrine imbalance, CVA, intracranial hemorrhage, sepsis, cardiac arrhythmia, central versus peripheral vertigo, illicit drug intoxication, alcohol intoxication, prescribed drug toxicity, pregnancy in females patients, anxiety disorder, versus other etiologies or a combination of the above.       ED COURSE:      Diagnostic Study Results     Abnormal lab results from this emergency department encounter:  Labs Reviewed   METABOLIC PANEL, BASIC - Abnormal; Notable for the following:        Result Value    Chloride 112 (*)     All other components within normal limits   CBC W/O DIFF - Abnormal; Notable for the following:     HGB 6.9 (*)     HCT 22.4 (*)     MCV 52.7 (*)     MCH 16.2 (*)     MCHC 30.8 (*)     RDW 21.8 (*)     All other components within normal limits   DIFFERENTIAL, AUTO - Abnormal; Notable for the following:     MONOCYTES 12 (*)     All other components within normal limits   CARDIAC PANEL,(CK, CKMB & TROPONIN)       Lab values for this patient within approximately the last 12 hours:  Recent Results (from the past 12 hour(s))   EKG, 12 LEAD, INITIAL    Collection Time: 03/11/18 11:56 AM   Result Value Ref Range    Ventricular Rate 71 BPM    Atrial Rate 71 BPM    P-R Interval 164 ms    QRS Duration 80 ms    Q-T Interval 398 ms    QTC Calculation (Bezet) 432 ms    Calculated P Axis 54 degrees    Calculated R Axis 39 degrees    Calculated T Axis 21 degrees    Diagnosis       Normal sinus rhythm  Normal ECG  No previous ECGs available  Confirmed by Simone Rodgers MD, ----- ([id number]) on 3/11/2018 82:84:65 PM     METABOLIC PANEL, BASIC    Collection Time: 03/11/18 12:23 PM   Result Value Ref Range    Sodium 141 136 - 145 mmol/L    Potassium 3.9 3.5 - 5.5 mmol/L    Chloride 112 (H) 100 - 108 mmol/L    CO2 24 21 - 32 mmol/L    Anion gap 5 3.0 - 18 mmol/L    Glucose 85 74 - 99 mg/dL    BUN 9 7.0 - 18 MG/DL    Creatinine 0.65 0.6 - 1.3 MG/DL    BUN/Creatinine ratio 14 12 - 20      GFR est AA >60 >60 ml/min/1.73m2    GFR est non-AA >60 >60 ml/min/1.73m2    Calcium 8.9 8.5 - 10.1 MG/DL   CBC W/O DIFF    Collection Time: 03/11/18 12:23 PM   Result Value Ref Range    WBC 6.4 4.6 - 13.2 K/uL    RBC 4.25 4.20 - 5.30 M/uL    HGB 6.9 (L) 12.0 - 16.0 g/dL    HCT 22.4 (L) 35.0 - 45.0 %    MCV 52.7 (L) 74.0 - 97.0 FL    MCH 16.2 (L) 24.0 - 34.0 PG    MCHC 30.8 (L) 31.0 - 37.0 g/dL    RDW 21.8 (H) 11.6 - 14.5 %    PLATELET 480 559 - 028 K/uL   CARDIAC PANEL,(CK, CKMB & TROPONIN)    Collection Time: 03/11/18 12:23 PM   Result Value Ref Range    CK 77 26 - 192 U/L    CK - MB <1.0 <3.6 ng/ml    CK-MB Index  0.0 - 4.0 %     CALCULATION NOT PERFORMED WHEN RESULT IS BELOW LINEAR LIMIT    Troponin-I, Qt. <0.02 0.0 - 0.045 NG/ML   DIFFERENTIAL, AUTO    Collection Time: 03/11/18 12:23 PM   Result Value Ref Range    NEUTROPHILS 52 40 - 73 %    LYMPHOCYTES 34 21 - 52 %    MONOCYTES 12 (H) 3 - 10 %    EOSINOPHILS 1 0 - 5 %    BASOPHILS 1 0 - 2 %    ABS. NEUTROPHILS 3.4 1.8 - 8.0 K/UL    ABS. LYMPHOCYTES 2.1 0.9 - 3.6 K/UL    ABS. MONOCYTES 0.7 0.05 - 1.2 K/UL    ABS. EOSINOPHILS 0.1 0.0 - 0.4 K/UL    ABS. BASOPHILS 0.0 0.0 - 0.06 K/UL    DF AUTOMATED         Radiologist and cardiologist interpretations if available at time of this note:  No results found. Medication(s) ordered for patient during this emergency visit encounter:  Medications   ketorolac (TORADOL) injection 30 mg (not administered)   sodium chloride 0.9 % bolus infusion 1,000 mL (not administered)       Medical Decision Making     I am the first provider for this patient. I reviewed the vital signs, available nursing notes, past medical history, past surgical history, family history and social history. Vital Signs:  Reviewed the patient's vital signs.     Initial EKG interpretation by attending emergency physician:  NSR about 70 bpm.    While unlikely, will assess the posterior fossa for hemorrhage with CT scan and will order routine labs seeking occult infection, metabolic derangement or evidence of anemia, acute worsening renal impairment, ACS/AMI (doubt), etc. My initial bedside impression is that this workup will likely be unremarkable and that the patient will ultimately be able to be discharged home, with instructions for further outpatient follow up and reevaluation by the patient's physician team.    IMPRESSION AND MEDICAL DECISION MAKING:  Based upon the patient's presentation with noted HPI and PE, along with the work up done in the emergency department, I believe that the patient is having dizziness of uncertain etiology. I am comfortable with discharge of the patient home with the noted prescribed medications, and outpatient follow up with the patients primary care physician. DIAGNOSIS:  1. Dizziness   2. Headache  3. Back pain  4. Symptomatic anemia. Admit to Hospitalist    The patient was presented to the accepting hospitalist, Dr. Alicja Vanegas. The patient's primary doctor is None, and admissions for this physician are with the hospitalist.  If the patient has no primary doctor, then admission is to the hospitalist as well. As the emergency physician, I wrote courtesy admission orders for the hospitalist physician. The courtesy orders included explicit instructions for the floor nursing staff to call the admitting attending physician upon patient arrival on the floor. Consult Obstetrics/Gynecology (Either Patient's or on Call Physician)    The obstetric gyncecology Aida Rosales was called and the patient was presented for obstetric gyncecology consult. I personally spoke with Dr. Josselyn Ocampo, in the obstetric gyncecology group, about the patient's presentation and management. Coding Diagnoses     Clinical Impression:   1. Dizziness    2. Acute nonintractable headache, unspecified headache type    3. Chronic bilateral low back pain without sciatica        Disposition     Disposition:  Admit. JOSE Virk Board Certified Emergency Physician    Provider Attestation:  If a scribe was utilized in generation of this patient record, I personally performed the services described in the documentation, reviewed the documentation, as recorded by the scribe in my presence, and it accurately records the patient's history of presenting illness, review of systems, patient physical examination, and procedures performed by me as the attending physician. Ritu Reid M.D. [facility] Board Certified Emergency Physician  3/11/2018.  12:49 PM    Scribe [employer] acting as a scribe for and in the presence of Morgan Clark MD      March 11, 2018 at 12:58 PM       Provider Attestation:      I personally performed the services described in the documentation, reviewed the documentation, as recorded by the scribe in my presence, and it accurately and completely records my words and actions.  March 11, 2018 at 12:58 PM - Morgan Clark MD

## 2018-03-11 NOTE — ED NOTES
Patient provided ice water after the OK from Dr. Vielka Norris. Patient is sitting comfortably on stretcher in no apparent distress, patient is on the monitor with call bell in reach, will continue to monitor.

## 2018-03-11 NOTE — IP AVS SNAPSHOT
[address] [place] [address] Jesus Velásquez Patient: Jessica Tripp MRN: [id number] VUL:[id number] A check priyanka indicates which time of day the medication should be taken. My Medications START taking these medications Instructions Each Dose to Equal  
 Morning Noon Evening Bedtime  
 ferrous sulfate 325 mg (65 mg iron) tablet Start taking on:  3/13/2018 Your last dose was: Your next dose is: Take 1 Tab by mouth daily (with breakfast). 325 mg Where to Get Your Medications Information on where to get these meds will be given to you by the nurse or doctor. ! Ask your nurse or doctor about these medications  
  ferrous sulfate 325 mg (65 mg iron) tablet

## 2018-03-11 NOTE — CONSULTS
Gynecology History and Physical    Name: Azra Mackay MRN: [id number] SSN: [id number]    YOB: 1980  Age: 41 y.o. Sex: female       Subjective:      Chief complaint: anemia    Marylen Presume is a 41 y.o. P6  female presents with 1wk of lightheadedness, and dizziness, with chest tightness. She was seen in the ED and noted to have hgb 6.9. She was admitted to the medicine service for blood transfusion and work up for anemia. Gyn consulted for h/o menorrhagia. LMP 3/5/18, pt states she is just finishing her period and only has light brown discharge. She states her periods are regular and last 7days. They are usually heavy with occasional small clots for the first 5 days. Her current period is the same. She denies dymenorrhea or h/o  Fibroids. She has head heavy periods for 17yrs. She was on depo-provera  but stopped because shes states it did not work. She has not seen a gynecologist since  after the birth of her last child. OB History      Para Term  AB Living    6 6 6   6    SAB TAB Ectopic Molar Multiple Live Births         6        Obstetric Comments    LMP 3/5/18  Periods regular, last 7d ays, flow heavy with occasional clots, nonedysmenorrhea  History of sexually transmitted infections none  Last pap unknown  Denies h.o fibroids or ovarian cyst.           Past Medical History:   Diagnosis Date    Anemia      No past surgical history on file. Social History     Occupational History    Not on file. Social History Main Topics    Smoking status: Never Smoker    Smokeless tobacco: Not on file    Alcohol use No    Drug use: No    Sexual activity: Not on file     No family history on file. No Known Allergies  Prior to Admission medications    Not on File        Review of Systems:  A comprehensive review of systems was negative except for that written in the History of Present Illness.      Objective:     Vitals:    18 1150 18 1200 03/11/18 1230 03/11/18 1637   BP: 135/83 120/84  99/64   Pulse: 79  61 61   Resp: 16  10 18   Temp: 99.2 °F (37.3 °C)   98.9 °F (37.2 °C)   SpO2: 100% 94% 100% 100%   Weight: 190 lb (86.2 kg)          Physical Exam:  Patient without distress. Heart: Regular rate and rhythm, S1S2 present or without murmur or extra heart sounds  Lung: clear to auscultation throughout lung fields, no wheezes, no rales, no rhonchi and normal respiratory effort  Abdomen: soft, nontender  Vagina: normal mucosa without prolapse or lesions and light brown discharge, no active bleeding  Cervix: normal appearance  Adnexa: normal bimanual exam  Uterus: normal single, nontender  Pelvic Ultrasound Transvaginal     CPT CODE: 77724     HISTORY: Heavy prolonged menstrual periods, anemia, vaginal bleeding. LMP  3/4/2018 and currently bleeding     COMPARISON: None.     TECHNIQUE: Images were obtained with the endovaginal probe.     FINDINGS:     The uterus is anteverted. It measures 8.7 x 5.1 x 7 cm. It is normal in size  configuration and echogenicity. The endometrial echo is well seen with the  double thickness endometrial echo measuring 0.4 cm. There is a small amount of  simple appearing fluid within the endometrial canal at the fundus. There is no  gestational sac. Myometrial echo pattern is normal.  The right ovary measures 2.6 x 2.4 x 3.4 cm. It is normal in size and  configuration. The left ovary measures 2.3 x 2.2 x 3.3 cm. It is normal in  size, configuration, and echogenicity. There is normal color flow in bilateral  spectral Doppler tracing from the ovaries. There is tiny amount of simple  appearing fluid in the cul de sac.     IMPRESSION  IMPRESSION:     Tiny amount of simple fluid in the endometrial canal.  Tiny amount of nonspecific simple fluid within the cul-de-sac        Report provided to floor at 1840 hrs.   Assessment:   38P6 with h/o menorrhagia presents with symptomatic anemia, currently stable not actively bleeding, TVUS unremarkable, AFVSS      Plan:   1. Continue care per primary  2. No further acute management needed at this time from a Gyn perspective as patient is not actively bleeding. She can follow up with Chris Lundberg for outpatient management of her menorrhagia in 2weeks.          Signed By:  Patricia Diamond MD     March 11, 2018 7:09 PM

## 2018-03-11 NOTE — ROUTINE PROCESS
TRANSFER - OUT REPORT:    Verbal report given to Irma (name) on Nicolette Ferreira  being transferred to [address] [place] [address]  (unit) for routine progression of care       Report consisted of patients Situation, Background, Assessment and   Recommendations(SBAR). Information from the following report(s) ED Summary and MAR was reviewed with the receiving nurse. Lines:   Peripheral IV 03/11/18 Right Antecubital (Active)   Site Assessment Clean, dry, & intact 3/11/2018 12:28 PM   Phlebitis Assessment 0 3/11/2018 12:28 PM   Infiltration Assessment 0 3/11/2018 12:28 PM   Dressing Status Clean, dry, & intact 3/11/2018 12:28 PM   Hub Color/Line Status Blue 3/11/2018 12:28 PM        Opportunity for questions and clarification was provided.       Patient transported with:   Registered Nurse

## 2018-03-12 VITALS
WEIGHT: 209.5 LBS | OXYGEN SATURATION: 100 % | HEART RATE: 65 BPM | TEMPERATURE: 98.7 F | SYSTOLIC BLOOD PRESSURE: 121 MMHG | RESPIRATION RATE: 20 BRPM | DIASTOLIC BLOOD PRESSURE: 78 MMHG | BODY MASS INDEX: 33.81 KG/M2

## 2018-03-12 LAB
ANION GAP SERPL CALC-SCNC: 7 MMOL/L (ref 3–18)
BUN SERPL-MCNC: 7 MG/DL (ref 7–18)
BUN/CREAT SERPL: 11 (ref 12–20)
CALCIUM SERPL-MCNC: 8.1 MG/DL (ref 8.5–10.1)
CHLORIDE SERPL-SCNC: 111 MMOL/L (ref 100–108)
CO2 SERPL-SCNC: 22 MMOL/L (ref 21–32)
CREAT SERPL-MCNC: 0.62 MG/DL (ref 0.6–1.3)
ERYTHROCYTE [DISTWIDTH] IN BLOOD BY AUTOMATED COUNT: 29.1 % (ref 11.6–14.5)
FERRITIN SERPL-MCNC: 3 NG/ML (ref 8–388)
FOLATE SERPL-MCNC: 10.8 NG/ML (ref 3.1–17.5)
GLUCOSE SERPL-MCNC: 89 MG/DL (ref 74–99)
HCT VFR BLD AUTO: 27.2 % (ref 35–45)
HGB BLD-MCNC: 8.7 G/DL (ref 12–16)
IRON SATN MFR SERPL: 15 %
IRON SERPL-MCNC: 68 UG/DL (ref 50–175)
MAGNESIUM SERPL-MCNC: 1.8 MG/DL (ref 1.6–2.6)
MCH RBC QN AUTO: 18.9 PG (ref 24–34)
MCHC RBC AUTO-ENTMCNC: 32 G/DL (ref 31–37)
MCV RBC AUTO: 59.1 FL (ref 74–97)
PHOSPHATE SERPL-MCNC: 3.7 MG/DL (ref 2.5–4.9)
PLATELET # BLD AUTO: 340 K/UL (ref 135–420)
POTASSIUM SERPL-SCNC: 3.7 MMOL/L (ref 3.5–5.5)
RBC # BLD AUTO: 4.6 M/UL (ref 4.2–5.3)
RETICS/RBC NFR AUTO: 0.8 % (ref 0.5–2.3)
SODIUM SERPL-SCNC: 140 MMOL/L (ref 136–145)
TIBC SERPL-MCNC: 456 UG/DL (ref 250–450)
TSH SERPL DL<=0.05 MIU/L-ACNC: 1.9 UIU/ML (ref 0.36–3.74)
VIT B12 SERPL-MCNC: 371 PG/ML (ref 211–911)
WBC # BLD AUTO: 5.8 K/UL (ref 4.6–13.2)

## 2018-03-12 PROCEDURE — 74011250637 HC RX REV CODE- 250/637: Performed by: INTERNAL MEDICINE

## 2018-03-12 PROCEDURE — 85045 AUTOMATED RETICULOCYTE COUNT: CPT | Performed by: INTERNAL MEDICINE

## 2018-03-12 PROCEDURE — 90471 IMMUNIZATION ADMIN: CPT

## 2018-03-12 PROCEDURE — 90686 IIV4 VACC NO PRSV 0.5 ML IM: CPT | Performed by: INTERNAL MEDICINE

## 2018-03-12 PROCEDURE — 36415 COLL VENOUS BLD VENIPUNCTURE: CPT | Performed by: INTERNAL MEDICINE

## 2018-03-12 PROCEDURE — 83540 ASSAY OF IRON: CPT | Performed by: INTERNAL MEDICINE

## 2018-03-12 PROCEDURE — 84443 ASSAY THYROID STIM HORMONE: CPT | Performed by: INTERNAL MEDICINE

## 2018-03-12 PROCEDURE — P9016 RBC LEUKOCYTES REDUCED: HCPCS | Performed by: EMERGENCY MEDICINE

## 2018-03-12 PROCEDURE — 36430 TRANSFUSION BLD/BLD COMPNT: CPT

## 2018-03-12 PROCEDURE — 80048 BASIC METABOLIC PNL TOTAL CA: CPT | Performed by: INTERNAL MEDICINE

## 2018-03-12 PROCEDURE — 82728 ASSAY OF FERRITIN: CPT | Performed by: INTERNAL MEDICINE

## 2018-03-12 PROCEDURE — 82607 VITAMIN B-12: CPT | Performed by: INTERNAL MEDICINE

## 2018-03-12 PROCEDURE — 99218 HC RM OBSERVATION: CPT

## 2018-03-12 PROCEDURE — 74011250636 HC RX REV CODE- 250/636: Performed by: INTERNAL MEDICINE

## 2018-03-12 PROCEDURE — 85027 COMPLETE CBC AUTOMATED: CPT | Performed by: INTERNAL MEDICINE

## 2018-03-12 PROCEDURE — 65270000029 HC RM PRIVATE

## 2018-03-12 PROCEDURE — 83735 ASSAY OF MAGNESIUM: CPT | Performed by: INTERNAL MEDICINE

## 2018-03-12 PROCEDURE — 84100 ASSAY OF PHOSPHORUS: CPT | Performed by: INTERNAL MEDICINE

## 2018-03-12 RX ORDER — LANOLIN ALCOHOL/MO/W.PET/CERES
325 CREAM (GRAM) TOPICAL
Qty: 30 TAB | Refills: 1 | Status: SHIPPED | OUTPATIENT
Start: 2018-03-13 | End: 2018-05-26

## 2018-03-12 RX ORDER — FOLIC ACID 1 MG/1
1 TABLET ORAL DAILY
Status: DISCONTINUED | OUTPATIENT
Start: 2018-03-12 | End: 2018-03-12 | Stop reason: HOSPADM

## 2018-03-12 RX ADMIN — FERROUS SULFATE TAB 325 MG (65 MG ELEMENTAL FE) 325 MG: 325 (65 FE) TAB at 09:31

## 2018-03-12 RX ADMIN — ACETAMINOPHEN 650 MG: 325 TABLET ORAL at 09:31

## 2018-03-12 RX ADMIN — SODIUM CHLORIDE 150 ML/HR: 900 INJECTION, SOLUTION INTRAVENOUS at 07:23

## 2018-03-12 RX ADMIN — INFLUENZA VIRUS VACCINE 0.5 ML: 15; 15; 15; 15 SUSPENSION INTRAMUSCULAR at 11:32

## 2018-03-12 RX ADMIN — FOLIC ACID 1 MG: 1 TABLET ORAL at 09:30

## 2018-03-12 NOTE — PROGRESS NOTES
Pt discharged per MD order. Awake and alert, in no distress. Discharge instructions reviewed with pt. States she understands. Pt transported to [facility] Main entrance via wheelchair by transport service. Personal belongings accompanied pt.

## 2018-03-12 NOTE — PROGRESS NOTES
Problem: Anemia Care Plan (Adult and Pediatric)  Goal: *Labs within defined limits  Outcome: Progressing Towards Goal  Pt currently receiving 1 of 2 blood transfusions.

## 2018-03-12 NOTE — DISCHARGE INSTRUCTIONS
Anemia: Care Instructions  Your Care Instructions    Anemia is a low level of red blood cells, which carry oxygen throughout your body. Many things can cause anemia. Lack of iron is one of the most common causes. Your body needs iron to make hemoglobin, a substance in red blood cells that carries oxygen from the lungs to your body's cells. Without enough iron, the body produces fewer and smaller red blood cells. As a result, your body's cells do not get enough oxygen, and you feel tired and weak. And you may have trouble concentrating. Bleeding is the most common cause of a lack of iron. You may have heavy menstrual bleeding or bleeding caused by conditions such as ulcers, hemorrhoids, or cancer. Regular use of aspirin or other anti-inflammatory medicines (such as ibuprofen) also can cause bleeding in some people. A lack of iron in your diet also can cause anemia, especially at times when the body needs more iron, such as during pregnancy, infancy, and the teen years. Your doctor may have prescribed iron pills. It may take several months of treatment for your iron levels to return to normal. Your doctor also may suggest that you eat foods that are rich in iron, such as meat and beans. There are many other causes of anemia. It is not always due to a lack of iron. Finding the specific cause of your anemia will help your doctor find the right treatment for you. Follow-up care is a key part of your treatment and safety. Be sure to make and go to all appointments, and call your doctor if you are having problems. It's also a good idea to know your test results and keep a list of the medicines you take. How can you care for yourself at home? · Take your medicines exactly as prescribed. Call your doctor if you think you are having a problem with your medicine. · If your doctor recommends iron pills, take them as directed:  ¨ Try to take the pills on an empty stomach about 1 hour before or 2 hours after meals. But you may need to take iron with food to avoid an upset stomach. ¨ Do not take antacids or drink milk or caffeine drinks (such as coffee, tea, or cola) at the same time or within 2 hours of the time that you take your iron. They can make it hard for your body to absorb the iron. ¨ Vitamin C (from food or supplements) helps your body absorb iron. Try taking iron pills with a glass of orange juice or some other food that is high in vitamin C, such as citrus fruits. ¨ Iron pills may cause stomach problems, such as heartburn, nausea, diarrhea, constipation, and cramps. Be sure to drink plenty of fluids, and include fruits, vegetables, and fiber in your diet each day. Iron pills often make your bowel movements dark or green. ¨ If you forget to take an iron pill, do not take a double dose of iron the next time you take a pill. ¨ Keep iron pills out of the reach of small children. An overdose of iron can be very dangerous. · Follow your doctor's advice about eating iron-rich foods. These include red meat, shellfish, poultry, eggs, beans, raisins, whole-grain bread, and leafy green vegetables. · Steam vegetables to help them keep their iron content. When should you call for help? Call 911 anytime you think you may need emergency care. For example, call if:  ? · You have symptoms of a heart attack. These may include:  ¨ Chest pain or pressure, or a strange feeling in the chest.  ¨ Sweating. ¨ Shortness of breath. ¨ Nausea or vomiting. ¨ Pain, pressure, or a strange feeling in the back, neck, jaw, or upper belly or in one or both shoulders or arms. ¨ Lightheadedness or sudden weakness. ¨ A fast or irregular heartbeat. After you call 911, the  may tell you to chew 1 adult-strength or 2 to 4 low-dose aspirin. Wait for an ambulance. Do not try to drive yourself. ? · You passed out (lost consciousness).    ?Call your doctor now or seek immediate medical care if:  ? · You have new or increased shortness of breath. ? · You are dizzy or lightheaded, or you feel like you may faint. ? · Your fatigue and weakness continue or get worse. ? · You have any abnormal bleeding, such as:  ¨ Nosebleeds. ¨ Vaginal bleeding that is different (heavier, more frequent, at a different time of the month) than what you are used to. ¨ Bloody or black stools, or rectal bleeding. ¨ Bloody or pink urine. ? Watch closely for changes in your health, and be sure to contact your doctor if:  ? · You do not get better as expected. Where can you learn more? Go to http://chiki-kervin.info/. Enter R301 in the search box to learn more about \"Anemia: Care Instructions. \"  Current as of: October 13, 2016  Content Version: 11.4  © 1318-5862 [employer]. Care instructions adapted under license by [employer] (which disclaims liability or warranty for this information). If you have questions about a medical condition or this instruction, always ask your healthcare professional. [place] [address] any warranty or liability for your use of this information. Abnormal Uterine Bleeding in Teens: Care Instructions  Your Care Instructions    Abnormal uterine bleeding (AUB) is irregular bleeding from the uterus that is longer or heavier than usual or does not occur at your regular time. Sometimes it's because of changes in hormone levels or growths in the uterus such as fibroids or polyps. Sometimes a cause cannot be found. You may have heavy bleeding when you are not expecting your period. Your doctor may suggest a pregnancy test, if you think you are pregnant. Follow-up care is a key part of your treatment and safety. Be sure to make and go to all appointments, and call your doctor if you are having problems. It's also a good idea to know your test results and keep a list of the medicines you take. How can you care for yourself at home? · Be safe with medicines.  Read and follow all instructions on the label. ¨ If the doctor gave you a prescription medicine for pain, take it as prescribed. ¨ If you are not taking a prescription pain medicine, ask your doctor if you can take an over-the-counter medicine. · You may be low in iron because of blood loss. Eat a balanced diet that is high in iron and vitamin C. Foods with a lot of iron include red meat, shellfish, and eggs. They also include beans and leafy green vegetables. Talk to your doctor about taking iron pills or a multivitamin. When should you call for help? Call 911 anytime you think you may need emergency care. For example, call if:  ? · You passed out (lost consciousness). ?Call your doctor now or seek immediate medical care if:  ? · You have new or worse belly or pelvic pain. ? · You are dizzy or lightheaded, or you feel like you may faint. ? · You have severe vaginal bleeding. ? Watch closely for changes in your health, and be sure to contact your doctor if:  ? · You think you may be pregnant. ? · Your bleeding gets worse. ? · You do not get better as expected. Where can you learn more? Go to http://chiki-kervin.info/. Enter Gail Manjarrez in the search box to learn more about \"Abnormal Uterine Bleeding in Teens: Care Instructions. \"  Current as of: October 13, 2016  Content Version: 11.4  © 4258-8918 [employer]. Care instructions adapted under license by [employer] (which disclaims liability or warranty for this information). If you have questions about a medical condition or this instruction, always ask your healthcare professional. [place] [address] any warranty or liability for your use of this information. Abnormal Uterine Bleeding: Care Instructions  Your Care Instructions    Abnormal uterine bleeding (AUB) is irregular bleeding from the uterus that is longer or heavier than usual or does not occur at your regular time.  Sometimes it is caused by changes in hormone levels. It can also be caused by growths in the uterus, such as fibroids or polyps. Sometimes a cause cannot be found. You may have heavy bleeding when you are not expecting your period. Your doctor may suggest a pregnancy test, if you think you are pregnant. Follow-up care is a key part of your treatment and safety. Be sure to make and go to all appointments, and call your doctor if you are having problems. It's also a good idea to know your test results and keep a list of the medicines you take. How can you care for yourself at home? · Be safe with medicines. Take pain medicines exactly as directed. ¨ If the doctor gave you a prescription medicine for pain, take it as prescribed. ¨ If you are not taking a prescription pain medicine, ask your doctor if you can take an over-the-counter medicine. · You may be low in iron because of blood loss. Eat a balanced diet that is high in iron and vitamin C. Foods rich in iron include red meat, shellfish, eggs, beans, and leafy green vegetables. Talk to your doctor about whether you need to take iron pills or a multivitamin. When should you call for help? Call 911 anytime you think you may need emergency care. For example, call if:  ? · You passed out (lost consciousness). ?Call your doctor now or seek immediate medical care if:  ? · You have new or worse belly or pelvic pain. ? · You have severe vaginal bleeding. ? · You feel dizzy or lightheaded, or you feel like you may faint. ? Watch closely for changes in your health, and be sure to contact your doctor if:  ? · You think you may be pregnant. ? · Your bleeding gets worse. ? · You do not get better as expected. Where can you learn more? Go to http://chiki-kervin.info/. Enter C231 in the search box to learn more about \"Abnormal Uterine Bleeding: Care Instructions. \"  Current as of: October 13, 2016  Content Version: 11.4  © 7352-3623 [employer]wise, [facility].  Care instructions adapted under license by [employer] (which disclaims liability or warranty for this information). If you have questions about a medical condition or this instruction, always ask your healthcare professional. [address] any warranty or liability for your use of this information.

## 2018-03-12 NOTE — PROGRESS NOTES
Ms Dmitriy Paulino has been hospitalized from 03/11/18-03/12/18 at [facility] and can return to work on 03/13/18.

## 2018-03-12 NOTE — PROGRESS NOTES
Care Management Interventions  PCP Verified by CM: Yes (referral to Angie Hinton)  Palliative Care Criteria Met (RRAT>21 & CHF Dx)?: No  Mode of Transport at Discharge: Other (see comment) (family will transport this pt home)  Transition of Care Consult (CM Consult): Discharge Planning  Current Support Network: Lives with Spouse, Own Home, Family Lives Nearby  Confirm Follow Up Transport: Self  Plan discussed with Pt/Family/Caregiver: Yes (pt reports that she has no discharge needs other than a return to work slip.  pt reports that spous will assist with care upon discharge)  [address] Provided?: No  Discharge Location  Discharge Placement: Home with family assistance

## 2018-03-12 NOTE — ROUTINE PROCESS
Bedside and Verbal shift change report given to Cristine Terry RN (oncoming nurse) by Roshan Montalvo (offgoing nurse). Report included the following information SBAR, Kardex, MAR and Recent Results.     SITUATION:    Code Status: Full Code   Reason for Admission: Symptomatic anemia    [facility] day: 0   Problem List:       Hospital Problems  Date Reviewed: 3/11/2018          Codes Class Noted POA    * (Principal)Symptomatic anemia ICD-10-CM: D64.9  ICD-9-CM: 285.9  3/11/2018 Unknown        Menorrhagia with regular cycle ICD-10-CM: N92.0  ICD-9-CM: 626.2  3/11/2018 Unknown              BACKGROUND:    Past Medical History:   Past Medical History:   Diagnosis Date    Anemia          Patient taking anticoagulants no     ASSESSMENT:    Changes in Assessment Throughout Shift: none     Patient has Central Line: no Reasons if yes: n/a   Patient has Shields Cath: no Reasons if yes: n/a      Last Vitals:     Vitals:    03/12/18 0135 03/12/18 0215 03/12/18 0315 03/12/18 0320   BP: 101/68 104/74 117/85    Pulse: 61 64 66    Resp: 18 19 18    Temp: 97.2 °F (36.2 °C) 97.4 °F (36.3 °C) 98 °F (36.7 °C)    SpO2: 100% 100% 100%    Weight:    95 kg (209 lb 8 oz)        IV and DRAINS (will only show if present)   [REMOVED] Peripheral IV 03/11/18 Right Antecubital-Site Assessment: Clean, dry, & intact  Peripheral IV 03/11/18 Left Hand-Site Assessment: Clean, dry, & intact     WOUND (if present)   Wound Type:  none   Dressing present Dressing Present : No   Wound Concerns/Notes:  none     PAIN    Pain Assessment    Pain Intensity 1: 0 (03/12/18 0432)    Pain Location 1: Back    Pain Intervention(s) 1: Medication (see MAR)    Patient Stated Pain Goal: 0  o Interventions for Pain:  yes  o Intervention effective: yes  o Time of last intervention: See MAR   o Reassessment Completed: yes      Last 3 Weights:  Last 3 Recorded Weights in this Encounter    03/11/18 1150 03/12/18 0320   Weight: 86.2 kg (190 lb) 95 kg (209 lb 8 oz)     Weight change:      INTAKE/OUPUT    Current Shift:      Last three shifts: 03/10 1901 - 03/12 0700  In: 5658 [P.O.:120; I.V.:797.5]  Out: -      LAB RESULTS     Recent Labs      03/12/18   0320  03/11/18   1223   WBC  5.8  6.4   HGB  8.7*  6.9*   HCT  27.2*  22.4*   PLT  340  379        Recent Labs      03/12/18   0320  03/11/18   1223   NA  140  141   K  3.7  3.9   GLU  89  85   BUN  7  9   CREA  0.62  0.65   CA  8.1*  8.9   MG  1.8   --        RECOMMENDATIONS AND DISCHARGE PLANNING     1. Pending tests/procedures/ Plan of Care or Other Needs: continue to monitor     2. Discharge plan for patient and Needs/Barriers: home    3. Estimated Discharge Date: TBD Posted on Whiteboard in Patients Room: no      4. The patient's care plan was reviewed with the oncoming nurse. \"HEALS\" SAFETY CHECK      Fall Risk    Total Score: 2    Safety Measures: Safety Measures: Bed/Chair-Wheels locked, Bed in low position, Call light within reach    A safety check occurred in the patient's room between off going nurse and oncoming nurse listed above. The safety check included the below items  Area Items   H  High Alert Medications - Verify all high alert medication drips (heparin, PCA, etc.)   E  Equipment - Suction is set up for ALL patients (with yanker)  - Red plugs utilized for all equipment (IV pumps, etc.)  - WOWs wiped down at end of shift.  - Room stocked with oxygen, suction, and other unit-specific supplies   A  Alarms - Bed alarm is set for fall risk patients  - Ensure chair alarm is in place and activated if patient is up in a chair   L  Lines - Check IV for any infiltration  - Shields bag is empty if patient has a Shields   - Tubing and IV bags are labeled   S  Safety   - Room is clean, patient is clean, and equipment is clean. - Hallways are clear from equipment besides carts.    - Fall bracelet on for fall risk patients  - Ensure room is clear and free of clutter  - Suction is set up for ALL patients (with yeimi)  - Hallways are clear from equipment besides carts.    - Isolation precautions followed, supplies available outside room, sign posted     [facility]

## 2018-03-12 NOTE — DISCHARGE SUMMARY
[facility]  Discharge Summary       Patient: Maged Junior Age: 41 y.o. : 1980 MR#: [id number] SSN: [id number]  PCP on record: None  Admit date: 3/11/2018  Discharge date: 3/12/2018    Consults:  Garrick Stallworth- Dr. Juno Ramos  -   Procedures:none  -     Significant Diagnostic Studies: 18:  Transvaginal ultrasound:Tiny amount of simple fluid in the endometrial canal.  Tiny amount of nonspecific simple fluid within the cul-de-sac  -    Discharge Diagnoses:  Acute blood loss anemia  Menorrhagia                                        Patient Active Problem List   Diagnosis Code    Symptomatic anemia D64.9    Menorrhagia with regular cycle N92.0       Hospital Course by Problem      41year old female with history of menorrhagia presented to ED w/ cc of generalized weakness, dizziness and SOB X 3 mos. Noted to have hgb of 6.9. History significant for reports of heavy menses. She was admitted, and transfused w/ 2 units of PRBC with appropriate rise in hgb. She was evaluated by gyn service w/ recs made for outpt f/u. On date of dc pt reports she felt better. Her B12 was 371, retic count was on low side considering her anemia. She will need outpt f/u.            Today's examination of the patient revealed:     Subjective:     Objective:   VS:   Visit Vitals    /80 (BP 1 Location: Left arm, BP Patient Position: At rest)    Pulse 68    Temp 98.2 °F (36.8 °C)    Resp 18    Wt 95 kg (209 lb 8 oz)    SpO2 100%    BMI 33.81 kg/m2      Tmax/24hrs: Temp (24hrs), Av.8 °F (36.6 °C), Min:97 °F (36.1 °C), Max:99.2 °F (37.3 °C)     Input/Output:   Intake/Output Summary (Last 24 hours) at 18 1110  Last data filed at 18 4069   Gross per 24 hour   Intake             1670 ml   Output                0 ml   Net             1670 ml       General:  Alert, awake, in nad  Cardiovascular:  Rrr, no murmurs  Pulmonary: ctab   GI: soft, nt, nd   Extremities:  No edema  Additional:      Labs:    Recent Results (from the past 24 hour(s))   EKG, 12 LEAD, INITIAL    Collection Time: 03/11/18 11:56 AM   Result Value Ref Range    Ventricular Rate 71 BPM    Atrial Rate 71 BPM    P-R Interval 164 ms    QRS Duration 80 ms    Q-T Interval 398 ms    QTC Calculation (Bezet) 432 ms    Calculated P Axis 54 degrees    Calculated R Axis 39 degrees    Calculated T Axis 21 degrees    Diagnosis       Normal sinus rhythm  Normal ECG  No previous ECGs available  Confirmed by Gigi Riley MD, ----- ([id number]) on 3/11/2018 06:85:42 PM     METABOLIC PANEL, BASIC    Collection Time: 03/11/18 12:23 PM   Result Value Ref Range    Sodium 141 136 - 145 mmol/L    Potassium 3.9 3.5 - 5.5 mmol/L    Chloride 112 (H) 100 - 108 mmol/L    CO2 24 21 - 32 mmol/L    Anion gap 5 3.0 - 18 mmol/L    Glucose 85 74 - 99 mg/dL    BUN 9 7.0 - 18 MG/DL    Creatinine 0.65 0.6 - 1.3 MG/DL    BUN/Creatinine ratio 14 12 - 20      GFR est AA >60 >60 ml/min/1.73m2    GFR est non-AA >60 >60 ml/min/1.73m2    Calcium 8.9 8.5 - 10.1 MG/DL   CBC W/O DIFF    Collection Time: 03/11/18 12:23 PM   Result Value Ref Range    WBC 6.4 4.6 - 13.2 K/uL    RBC 4.25 4.20 - 5.30 M/uL    HGB 6.9 (L) 12.0 - 16.0 g/dL    HCT 22.4 (L) 35.0 - 45.0 %    MCV 52.7 (L) 74.0 - 97.0 FL    MCH 16.2 (L) 24.0 - 34.0 PG    MCHC 30.8 (L) 31.0 - 37.0 g/dL    RDW 21.8 (H) 11.6 - 14.5 %    PLATELET 937 060 - 135 K/uL   CARDIAC PANEL,(CK, CKMB & TROPONIN)    Collection Time: 03/11/18 12:23 PM   Result Value Ref Range    CK 77 26 - 192 U/L    CK - MB <1.0 <3.6 ng/ml    CK-MB Index  0.0 - 4.0 %     CALCULATION NOT PERFORMED WHEN RESULT IS BELOW LINEAR LIMIT    Troponin-I, Qt. <0.02 0.0 - 0.045 NG/ML   DIFFERENTIAL, AUTO    Collection Time: 03/11/18 12:23 PM   Result Value Ref Range    NEUTROPHILS 52 40 - 73 %    LYMPHOCYTES 34 21 - 52 %    MONOCYTES 12 (H) 3 - 10 %    EOSINOPHILS 1 0 - 5 %    BASOPHILS 1 0 - 2 %    ABS. NEUTROPHILS 3.4 1.8 - 8.0 K/UL    ABS. LYMPHOCYTES 2.1 0.9 - 3.6 K/UL    ABS. MONOCYTES 0.7 0.05 - 1.2 K/UL    ABS. EOSINOPHILS 0.1 0.0 - 0.4 K/UL    ABS.  BASOPHILS 0.0 0.0 - 0.06 K/UL    DF AUTOMATED     TYPE + CROSSMATCH    Collection Time: 03/11/18  7:30 PM   Result Value Ref Range    Crossmatch Expiration 03/14/2018     ABO/Rh(D) O POSITIVE     Antibody screen NEG     Unit number [id number]     Blood component type  LR,1     Unit division 00     Status of unit ISSUED     Crossmatch result Compatible     Unit number [id number]     Blood component type RC LR     Unit division 00     Status of unit TRANSFUSED     Crossmatch result Compatible    METABOLIC PANEL, BASIC    Collection Time: 03/12/18  3:20 AM   Result Value Ref Range    Sodium 140 136 - 145 mmol/L    Potassium 3.7 3.5 - 5.5 mmol/L    Chloride 111 (H) 100 - 108 mmol/L    CO2 22 21 - 32 mmol/L    Anion gap 7 3.0 - 18 mmol/L    Glucose 89 74 - 99 mg/dL    BUN 7 7.0 - 18 MG/DL    Creatinine 0.62 0.6 - 1.3 MG/DL    BUN/Creatinine ratio 11 (L) 12 - 20      GFR est AA >60 >60 ml/min/1.73m2    GFR est non-AA >60 >60 ml/min/1.73m2    Calcium 8.1 (L) 8.5 - 10.1 MG/DL   MAGNESIUM    Collection Time: 03/12/18  3:20 AM   Result Value Ref Range    Magnesium 1.8 1.6 - 2.6 mg/dL   PHOSPHORUS    Collection Time: 03/12/18  3:20 AM   Result Value Ref Range    Phosphorus 3.7 2.5 - 4.9 MG/DL   CBC W/O DIFF    Collection Time: 03/12/18  3:20 AM   Result Value Ref Range    WBC 5.8 4.6 - 13.2 K/uL    RBC 4.60 4.20 - 5.30 M/uL    HGB 8.7 (L) 12.0 - 16.0 g/dL    HCT 27.2 (L) 35.0 - 45.0 %    MCV 59.1 (L) 74.0 - 97.0 FL    MCH 18.9 (L) 24.0 - 34.0 PG    MCHC 32.0 31.0 - 37.0 g/dL    RDW 29.1 (H) 11.6 - 14.5 %    PLATELET 361 704 - 919 K/uL   IRON PROFILE    Collection Time: 03/12/18  3:20 AM   Result Value Ref Range    Iron 68 50 - 175 ug/dL    TIBC 456 (H) 250 - 450 ug/dL    Iron % saturation 15 %   FERRITIN    Collection Time: 03/12/18  3:20 AM   Result Value Ref Range    Ferritin 3 (L) 8 - 388 NG/ML VITAMIN B12 & FOLATE    Collection Time: 03/12/18  3:20 AM   Result Value Ref Range    Vitamin B12 371 211 - 911 pg/mL    Folate 10.8 3.10 - 17.50 ng/mL   RETICULOCYTE COUNT    Collection Time: 03/12/18  3:20 AM   Result Value Ref Range    Reticulocyte count 0.8 0.5 - 2.3 %   TSH 3RD GENERATION    Collection Time: 03/12/18  3:20 AM   Result Value Ref Range    TSH 1.90 0.36 - 3.74 uIU/mL     Additional Data Reviewed:     Condition: stable  Disposition:    [x]Home   []Home with Home Health   []SNF/NH   []Rehab   []Home with family   []Alternate Facility:____________________      Discharge Medications:     Current Discharge Medication List      START taking these medications    Details   ferrous sulfate 325 mg (65 mg iron) tablet Take 1 Tab by mouth daily (with breakfast). Qty: 30 Tab, Refills: 1               Follow-up Appointments:   1. Your PCP: None, within 7-10days  2.  Gyn in 2-3 wks            >30 minutes spent coordinating this discharge (review instructions/follow-up, prescriptions, preparing report for sign off)    Signed:  Layla Zurita MD  3/12/2018  11:10 AM

## 2018-03-13 LAB
ABO + RH BLD: NORMAL
BLD PROD TYP BPU: NORMAL
BLD PROD TYP BPU: NORMAL
BLOOD GROUP ANTIBODIES SERPL: NORMAL
BPU ID: NORMAL
BPU ID: NORMAL
CROSSMATCH RESULT,%XM: NORMAL
CROSSMATCH RESULT,%XM: NORMAL
SPECIMEN EXP DATE BLD: NORMAL
STATUS OF UNIT,%ST: NORMAL
STATUS OF UNIT,%ST: NORMAL
UNIT DIVISION, %UDIV: 0
UNIT DIVISION, %UDIV: 0

## 2018-03-15 ENCOUNTER — HOSPITAL ENCOUNTER (EMERGENCY)
Age: 38
Discharge: HOME | End: 2018-03-15
Attending: EMERGENCY MEDICINE
Payer: SELF-PAY

## 2018-03-15 VITALS
OXYGEN SATURATION: 96 % | TEMPERATURE: 98.6 F | SYSTOLIC BLOOD PRESSURE: 139 MMHG | RESPIRATION RATE: 20 BRPM | DIASTOLIC BLOOD PRESSURE: 90 MMHG | HEART RATE: 87 BPM

## 2018-03-15 DIAGNOSIS — T50.Z95A VACCINATION REACTION, INITIAL ENCOUNTER: Primary | ICD-10-CM

## 2018-03-15 PROCEDURE — 99282 EMERGENCY DEPT VISIT SF MDM: CPT

## 2018-03-15 RX ORDER — DIPHENHYDRAMINE HCL 25 MG
CAPSULE ORAL
Qty: 16 CAP | Refills: 0 | Status: SHIPPED | OUTPATIENT
Start: 2018-03-15 | End: 2018-05-26

## 2018-03-15 NOTE — DISCHARGE INSTRUCTIONS

## 2018-03-15 NOTE — ED TRIAGE NOTES
Pt has complaints right arm after receiving flu shot on 3/13 with sharp pain. Pt also reports that her left arm is starting to hurt and itch as well.  Denies difficulty breathing, angioedema

## 2018-03-15 NOTE — ED PROVIDER NOTES
EMERGENCY DEPARTMENT HISTORY AND PHYSICAL EXAM    1:16 PM      Date: 3/15/2018  Patient Name: Fidel Myers    History of Presenting Illness     Chief Complaint   Patient presents with    Arm Pain    Allergic Reaction         History Provided By: Patient    Chief Complaint: arm pain/swelling  Duration: 2 Days  Timing:  Acute  Location: right upper arm  Quality: Burning, Sharp and itching  Severity: Moderate  Modifying Factors: got Flu shot 2 days ago  Associated Symptoms: denies any other associated signs or symptoms      Additional History (Context): Fidel Myers is a 41 y.o. female with No significant past medical history who presents with 2 days of acute onset moderating right arm pain (burning, sharp, itching) with swelling after getting a FLU shot 2 days ago. Pt states in the area that she got her Flu shot 2 days has been swollen and painful. Pt denies history of past vaccine reactions, has never received a flu shot before. Has been taking Benadryl for itching. No other concerns or symptoms at this time. PCP: None    Current Outpatient Prescriptions   Medication Sig Dispense Refill    diphenhydrAMINE (BENADRYL) 25 mg capsule Take 1-2 tabs every 6 hours as needed. 16 Cap 0    ferrous sulfate 325 mg (65 mg iron) tablet Take 1 Tab by mouth daily (with breakfast). 30 Tab 1       Past History     Past Medical History:  Past Medical History:   Diagnosis Date    Anemia        Past Surgical History:  History reviewed. No pertinent surgical history. Family History:  History reviewed. No pertinent family history. Social History:  Social History   Substance Use Topics    Smoking status: Never Smoker    Smokeless tobacco: None    Alcohol use No       Allergies:  No Known Allergies      Review of Systems       Review of Systems   Constitutional: Negative for chills, fatigue and fever. HENT: Negative. Negative for sore throat. Eyes: Negative.     Respiratory: Negative for cough and shortness of breath. Cardiovascular: Negative for chest pain and palpitations. Gastrointestinal: Negative for abdominal pain, nausea and vomiting. Genitourinary: Negative for dysuria. Musculoskeletal:        Positive for right upper arm swelling  Positive for right upper arm pain   Skin: Negative. Neurological: Negative for dizziness, weakness, light-headedness and headaches. Psychiatric/Behavioral: Negative. All other systems reviewed and are negative. Physical Exam     Visit Vitals    /90 (BP 1 Location: Left arm, BP Patient Position: Sitting)    Pulse 87    Temp 98.6 °F (37 °C)    Resp 20    SpO2 96%         Physical Exam   Constitutional: She is oriented to person, place, and time. She appears well-developed and well-nourished. HENT:   Head: Normocephalic and atraumatic. Mouth/Throat: Oropharynx is clear and moist.   Eyes: Conjunctivae are normal. Pupils are equal, round, and reactive to light. No scleral icterus. Neck: Normal range of motion. Neck supple. No JVD present. No tracheal deviation present. Cardiovascular: Normal rate, regular rhythm and normal heart sounds. Pulmonary/Chest: Effort normal and breath sounds normal. No respiratory distress. She has no wheezes. Abdominal: Soft. Bowel sounds are normal.   Musculoskeletal: Normal range of motion. Neurological: She is alert and oriented to person, place, and time. She has normal strength. Gait normal. GCS eye subscore is 4. GCS verbal subscore is 5. GCS motor subscore is 6. Skin: Skin is warm and dry. 9 cm X 8 cm circular area of edema, erythema with no fluctuance, no drainage, no streaking into extremity    Psychiatric: She has a normal mood and affect. Nursing note and vitals reviewed. Diagnostic Study Results     Labs -  No results found for this or any previous visit (from the past 12 hour(s)).     Radiologic Studies -   No orders to display         Medical Decision Making   I am the first provider for this patient. I reviewed the vital signs, available nursing notes, past medical history, past surgical history, family history and social history. Vital Signs-Reviewed the patient's vital signs. Records Reviewed: Nursing Notes (Time of Review: 1:16 PM)    ED Course: Progress Notes, Reevaluation, and Consults:  1:23 PM  Outlined area of edema with marking pen. Discussed need for close follow-up for any new, worsening, or persistent symptoms, including fever/chills, increased redness, or any other medical concerns. Provider Notes (Medical Decision Making): 42 yo F who recently received a flu shot in the R arm presents due to pain, redness, and pruritus of R arm at site of injection. Has not received a flu shot in the past. Denies hx of allergy to eggs. Afebrile, VS stable, looks well. No streaking into extremity. Appears consistent with site reaction, no signs of cellulitis. Area outlined with marking pen. Stable for d/c with outpatient follow-up. Diagnosis     Clinical Impression:   1. Vaccination reaction, initial encounter        Disposition: home    Follow-up Information     Follow up With Details Comments Contact Info    [facility] EMERGENCY DEPT  If symptoms worsen [address] [place] [address] Schedule an appointment as soon as possible for a visit  [facility] [address]  [address]  [phone]           Patient's Medications   Start Taking    DIPHENHYDRAMINE (BENADRYL) 25 MG CAPSULE    Take 1-2 tabs every 6 hours as needed. Continue Taking    FERROUS SULFATE 325 MG (65 MG IRON) TABLET    Take 1 Tab by mouth daily (with breakfast).    These Medications have changed    No medications on file   Stop Taking    No medications on file     _______________________________    Attestations:  [facility] acting as a scribe for and in the presence of Estefani Nielson PA-C      March 15, 2018 at 1:16 PM       Provider Attestation:      I personally performed the services described in the documentation, reviewed the documentation, as recorded by the scribe in my presence, and it accurately and completely records my words and actions.  March 15, 2018 at 1:16 PM - Lanre Schmidt PA-C    _______________________________

## 2018-11-21 ENCOUNTER — HOSPITAL ENCOUNTER (EMERGENCY)
Age: 38
Discharge: HOME | End: 2018-11-21
Attending: EMERGENCY MEDICINE
Payer: SELF-PAY

## 2018-11-21 VITALS
SYSTOLIC BLOOD PRESSURE: 133 MMHG | OXYGEN SATURATION: 100 % | HEART RATE: 78 BPM | TEMPERATURE: 99.2 F | HEIGHT: 66 IN | DIASTOLIC BLOOD PRESSURE: 88 MMHG | RESPIRATION RATE: 18 BRPM | WEIGHT: 190 LBS | BODY MASS INDEX: 30.53 KG/M2

## 2018-11-21 DIAGNOSIS — J06.9 ACUTE UPPER RESPIRATORY INFECTION: Primary | ICD-10-CM

## 2018-11-21 PROCEDURE — 99282 EMERGENCY DEPT VISIT SF MDM: CPT

## 2018-11-21 NOTE — LETTER
NOTIFICATION RETURN TO WORK / SCHOOL 
 
11/21/2018 12:35 PM 
 
Ms. Magali Clancy [address] [facility] [id number] [place] [id number] To Whom It May Concern: 
 
Magali Clancy is currently under the care of [facility] EMERGENCY DEPT. She will return to work/school on: 11/24/18. If there are questions or concerns please have the patient contact our office.  
 
 
 
Sincerely, 
 
 
Kenya Hernandez PA-C

## 2018-11-21 NOTE — ED PROVIDER NOTES
EMERGENCY DEPARTMENT HISTORY AND PHYSICAL EXAM 
 
Date: 11/21/2018 Patient Name: Chavez Mosquera History of Presenting Illness Chief Complaint Patient presents with  Cough  Generalized Body Aches  Chills History Provided By: Patient Chief Complaint: cough Duration: 2 Days Timing:  Acute Location: chest 
Quality: Dull Severity: Mild Modifying Factors: none Associated Symptoms: denies any other associated signs or symptoms Additional History (Context): Chavez Mosquera is a 41 y.o. female with No significant past medical history who presents with cough x 2d. Denies fever, tobacco, h/o asthma, RAD, COPD, wheezing, SOB. Denies sinus congestion. Needs work note. PCP: None Current Outpatient Medications Medication Sig Dispense Refill  dextromethorphan-guaiFENesin (ROBITUSSIN-DM)  mg/5 mL syrup Take 10 mL by mouth every six (6) hours as needed for Cough. 240 mL 0  
 Camphor-Eucalyptus Oil-Menthol (VICKS VAPORUB) 4.8-1.2-2.6 % oint 1 Actuation(s) by Apply Externally route three (3) times daily as needed. 50 g 0  
 methocarbamol (ROBAXIN-750) 750 mg tablet Take 1 Tab by mouth four (4) times daily. 20 Tab 0 Past History Past Medical History: 
Past Medical History:  
Diagnosis Date  Anemia Past Surgical History: No past surgical history on file. Family History: No family history on file. Social History: 
Social History Tobacco Use  Smoking status: Never Smoker Substance Use Topics  Alcohol use: No  
 Drug use: No  
 
 
Allergies: 
No Known Allergies Review of Systems Review of Systems Constitutional: Negative for fever. Respiratory: Positive for cough. Negative for shortness of breath and wheezing. All other systems reviewed and are negative. All Other Systems Negative Physical Exam  
 
Vitals:  
 11/21/18 1232 BP: 133/88 Pulse: 78 Resp: 18 Temp: 99.2 °F (37.3 °C) SpO2: 100% Weight: 86.2 kg (190 lb) Height: 5' 6\" (1.676 m) Physical Exam  
Constitutional: She is oriented to person, place, and time. She appears well-developed. No distress. HENT:  
Head: Normocephalic and atraumatic. Eyes: Pupils are equal, round, and reactive to light. Neck: No JVD present. No tracheal deviation present. No thyromegaly present. Cardiovascular: Normal rate, regular rhythm and normal heart sounds. Exam reveals no gallop and no friction rub. No murmur heard. Pulmonary/Chest: Effort normal and breath sounds normal. No stridor. No respiratory distress. She has no wheezes. She has no rales. She exhibits no tenderness. Abdominal: Soft. She exhibits no distension and no mass. There is no tenderness. There is no rebound and no guarding. Musculoskeletal: She exhibits no edema or tenderness. Lymphadenopathy:  
  She has no cervical adenopathy. Neurological: She is alert and oriented to person, place, and time. Skin: Skin is warm and dry. No rash noted. No erythema. No pallor. Psychiatric: She has a normal mood and affect. Her behavior is normal. Thought content normal.  
Nursing note and vitals reviewed. Diagnostic Study Results Labs - No results found for this or any previous visit (from the past 12 hour(s)). Radiologic Studies - No orders to display CT Results  (Last 48 hours) None CXR Results  (Last 48 hours) None Medical Decision Making I am the first provider for this patient. I reviewed the vital signs, available nursing notes, past medical history, past surgical history, family history and social history. Vital Signs-Reviewed the patient's vital signs. Records Reviewed: Nursing Notes Procedures: 
Procedures Provider Notes (Medical Decision Making): treat URI. MED RECONCILIATION: 
No current facility-administered medications for this encounter. Current Outpatient Medications Medication Sig  
  dextromethorphan-guaiFENesin (ROBITUSSIN-DM)  mg/5 mL syrup Take 10 mL by mouth every six (6) hours as needed for Cough.  Camphor-Eucalyptus Oil-Menthol (VICKS VAPORUB) 4.8-1.2-2.6 % oint 1 Actuation(s) by Apply Externally route three (3) times daily as needed.  methocarbamol (ROBAXIN-750) 750 mg tablet Take 1 Tab by mouth four (4) times daily. Disposition: 
home DISCHARGE NOTE:  
12:43 PM 
 
Pt has been reexamined. Patient has no new complaints, changes, or physical findings. Care plan outlined and precautions discussed. Results of exam were reviewed with the patient. All medications were reviewed with the patient; will d/c home with robitussin, vicks vapor rub. All of pt's questions and concerns were addressed. Patient was instructed and agrees to follow up with PCP, as well as to return to the ED upon further deterioration. Patient is ready to go home. Follow-up Information Follow up With Specialties Details Why Contact Info [facility]  Schedule an appointment as soon as possible for a visit in 2 days As needed ALBERT[address] [facility] EMERGENCY DEPT Emergency Medicine  If symptoms worsen return immediately [address] Current Discharge Medication List  
  
START taking these medications Details  
dextromethorphan-guaiFENesin (ROBITUSSIN-DM)  mg/5 mL syrup Take 10 mL by mouth every six (6) hours as needed for Cough. Qty: 240 mL, Refills: 0 Camphor-Eucalyptus Oil-Menthol (VICKS VAPORUB) 4.8-1.2-2.6 % oint 1 Actuation(s) by Apply Externally route three (3) times daily as needed. Qty: 50 g, Refills: 0 Diagnosis Clinical Impression: 1. Acute upper respiratory infection

## 2018-11-21 NOTE — ED TRIAGE NOTES
Pt. States \"I'm having a lot of coughing\" reports symptoms started on Monday. Pt. Also c/o fever and chills.

## 2018-11-21 NOTE — DISCHARGE INSTRUCTIONS

## 2018-11-29 ENCOUNTER — HOSPITAL ENCOUNTER (EMERGENCY)
Age: 38
Discharge: HOME | End: 2018-11-29
Attending: EMERGENCY MEDICINE
Payer: SELF-PAY

## 2018-11-29 VITALS
TEMPERATURE: 97.7 F | BODY MASS INDEX: 30.53 KG/M2 | HEIGHT: 66 IN | WEIGHT: 190 LBS | RESPIRATION RATE: 16 BRPM | OXYGEN SATURATION: 100 % | SYSTOLIC BLOOD PRESSURE: 116 MMHG | DIASTOLIC BLOOD PRESSURE: 74 MMHG | HEART RATE: 68 BPM

## 2018-11-29 DIAGNOSIS — M25.561 CHRONIC PAIN OF RIGHT KNEE: Primary | ICD-10-CM

## 2018-11-29 DIAGNOSIS — G89.29 CHRONIC PAIN OF RIGHT KNEE: Primary | ICD-10-CM

## 2018-11-29 PROCEDURE — 99282 EMERGENCY DEPT VISIT SF MDM: CPT

## 2018-11-29 RX ORDER — NAPROXEN 500 MG/1
500 TABLET ORAL 2 TIMES DAILY WITH MEALS
Qty: 20 TAB | Refills: 0 | Status: SHIPPED | OUTPATIENT
Start: 2018-11-29 | End: 2018-12-16

## 2018-11-29 NOTE — ED PROVIDER NOTES
Noé Houston is a 41 y.o. female with hx of chronic ankle and knee pain that presents to the ED with a complaint of right knee pain and swelling x1 day. PT states she has had this pain in the past and has stayed home from work to day to elevate her leg but states she needs a w work note. NO other complaints at this time Past Medical History:  
Diagnosis Date  Anemia No past surgical history on file. No family history on file. Social History Socioeconomic History  Marital status:  Spouse name: Not on file  Number of children: Not on file  Years of education: Not on file  Highest education level: Not on file Social Needs  Financial resource strain: Not on file  Food insecurity - worry: Not on file  Food insecurity - inability: Not on file  Transportation needs - medical: Not on file  Transportation needs - non-medical: Not on file Occupational History  Not on file Tobacco Use  Smoking status: Never Smoker Substance and Sexual Activity  Alcohol use: No  
 Drug use: No  
 Sexual activity: Not on file Other Topics Concern  Not on file Social History Narrative  Not on file ALLERGIES: Patient has no known allergies. Review of Systems HENT: Negative for congestion. Eyes: Negative for pain. Respiratory: Negative for shortness of breath. Cardiovascular: Negative for chest pain. Gastrointestinal: Negative for abdominal pain, diarrhea, nausea and vomiting. Genitourinary: Negative for dysuria. Musculoskeletal: Positive for arthralgias and myalgias. Negative for back pain. Skin: Negative for wound. Neurological: Negative for dizziness and headaches. All other systems reviewed and are negative. Vitals:  
 11/29/18 0810 BP: 116/74 Pulse: 68 Resp: 16 Temp: 97.7 °F (36.5 °C) SpO2: 100% Weight: 86.2 kg (190 lb) Height: 5' 6\" (1.676 m) Physical Exam  
 Constitutional: She is oriented to person, place, and time. She appears well-developed and well-nourished. No distress. HENT:  
Head: Normocephalic and atraumatic. Eyes: Pupils are equal, round, and reactive to light. Neck: Normal range of motion. Cardiovascular: Normal rate. Pulmonary/Chest: Effort normal. No respiratory distress. Musculoskeletal: She exhibits no edema or tenderness. Left knee: She exhibits decreased range of motion. She exhibits no swelling, no effusion, no ecchymosis, no deformity, no laceration, no erythema, normal alignment, no LCL laxity, normal patellar mobility, no bony tenderness, normal meniscus and no MCL laxity. No tenderness found. Neurological: She is alert and oriented to person, place, and time. Skin: Skin is warm. MDM Number of Diagnoses or Management Options Chronic pain of right knee:  
Diagnosis management comments: Pt states this is a chronic issue  With no recent injury or trauma. Will defer imaging at this time. Work note provided and pt will be discharged home with naproxen Risk of Complications, Morbidity, and/or Mortality Presenting problems: low Diagnostic procedures: low Management options: low Procedures

## 2018-11-29 NOTE — LETTER
NOTIFICATION RETURN TO WORK / SCHOOL 
 
11/29/2018 8:27 AM 
 
Ms. Fabienne Rooney [address] [facility] [id number] To Whom It May Concern: 
 
Fabienne Rooney is currently under the care of [facility] EMERGENCY DEPT. She will return to work/school on: 11.30.18 If there are questions or concerns please have the patient contact our office.  
 
 
 
Sincerely, 
 
 
KINSEY Saldivar

## 2018-11-29 NOTE — ED TRIAGE NOTES
Pt. States \"My left leg is swollen and my knee is swollen; my toes started tingling and I felt numbness\". Reports symptoms started yesterday.

## 2018-11-29 NOTE — DISCHARGE INSTRUCTIONS
Chronic Pain: Care Instructions  Your Care Instructions    Chronic pain is pain that lasts a long time (months or even years) and may or may not have a clear cause. It is different from acute pain, which usually does have a clear cause--like an injury or illness--and gets better over time. Chronic pain:  · Lasts over time but may vary from day to day. · Does not go away despite efforts to end it. · May disrupt your sleep and lead to fatigue. · May cause depression or anxiety. · May make your muscles tense, causing more pain. · Can disrupt your work, hobbies, home life, and relationships with friends and family. Chronic pain is a very real condition. It is not just in your head. Treatment can help and usually includes several methods used together, such as medicines, physical therapy, exercise, and other treatments. Learning how to relax and changing negative thought patterns can also help you cope. Chronic pain is complex. Taking an active role in your treatment will help you better manage your pain. Tell your doctor if you have trouble dealing with your pain. You may have to try several things before you find what works best for you. Follow-up care is a key part of your treatment and safety. Be sure to make and go to all appointments, and call your doctor if you are having problems. It's also a good idea to know your test results and keep a list of the medicines you take. How can you care for yourself at home? · Pace yourself. Break up large jobs into smaller tasks. Save harder tasks for days when you have less pain, or go back and forth between hard tasks and easier ones. Take rest breaks. · Relax, and reduce stress. Relaxation techniques such as deep breathing or meditation can help. · Keep moving. Gentle, daily exercise can help reduce pain over the long run. Try low- or no-impact exercises such as walking, swimming, and stationary biking. Do stretches to stay flexible.   · Try heat, cold packs, and massage. · Get enough sleep. Chronic pain can make you tired and drain your energy. Talk with your doctor if you have trouble sleeping because of pain. · Think positive. Your thoughts can affect your pain level. Do things that you enjoy to distract yourself when you have pain instead of focusing on the pain. See a movie, read a book, listen to music, or spend time with a friend. · If you think you are depressed, talk to your doctor about treatment. · Keep a daily pain diary. Record how your moods, thoughts, sleep patterns, activities, and medicine affect your pain. You may find that your pain is worse during or after certain activities or when you are feeling a certain emotion. Having a record of your pain can help you and your doctor find the best ways to treat your pain. · Take pain medicines exactly as directed. ? If the doctor gave you a prescription medicine for pain, take it as prescribed. ? If you are not taking a prescription pain medicine, ask your doctor if you can take an over-the-counter medicine. Reducing constipation caused by pain medicine  · Include fruits, vegetables, beans, and whole grains in your diet each day. These foods are high in fiber. · Drink plenty of fluids, enough so that your urine is light yellow or clear like water. If you have kidney, heart, or liver disease and have to limit fluids, talk with your doctor before you increase the amount of fluids you drink. · If your doctor recommends it, get more exercise. Walking is a good choice. Bit by bit, increase the amount you walk every day. Try for at least 30 minutes on most days of the week. · Schedule time each day for a bowel movement. A daily routine may help. Take your time and do not strain when having a bowel movement. When should you call for help?   Call your doctor now or seek immediate medical care if:    · Your pain gets worse or is out of control.     · You feel down or blue, or you do not enjoy things like you once did. You may be depressed, which is common in people with chronic pain. Depression can be treated.     · You have vomiting or cramps for more than 2 hours.    Watch closely for changes in your health, and be sure to contact your doctor if:    · You cannot sleep because of pain.     · You are very worried or anxious about your pain.     · You have trouble taking your pain medicine.     · You have any concerns about your pain medicine.     · You have trouble with bowel movements, such as:  ? No bowel movement in 3 days. ? Blood in the anal area, in your stool, or on the toilet paper. ? Diarrhea for more than 24 hours. Where can you learn more? Go to http://chiki-kervin.info/. Enter N004 in the search box to learn more about \"Chronic Pain: Care Instructions. \"  Current as of: June 4, 2018  Content Version: 11.8  © 1531-2729 [employer]. Care instructions adapted under license by [employer] (which disclaims liability or warranty for this information). If you have questions about a medical condition or this instruction, always ask your healthcare professional. [place] [address] any warranty or liability for your use of this information. Joint Pain: Care Instructions  Your Care Instructions    Many people have small aches and pains from overuse or injury to muscles and joints. Joint injuries often happen during sports or recreation, work tasks, or projects around the home. An overuse injury can happen when you put too much stress on a joint or when you do an activity that stresses the joint over and over, such as using the computer or rowing a boat. You can take action at home to help your muscles and joints get better. You should feel better in 1 to 2 weeks, but it can take 3 months or more to heal completely. Follow-up care is a key part of your treatment and safety.  Be sure to make and go to all appointments, and call your doctor if you are having problems. It's also a good idea to know your test results and keep a list of the medicines you take. How can you care for yourself at home? · Do not put weight on the injured joint for at least a day or two. · For the first day or two after an injury, do not take hot showers or baths, and do not use hot packs. The heat could make swelling worse. · Put ice or a cold pack on the sore joint for 10 to 20 minutes at a time. Try to do this every 1 to 2 hours for the next 3 days (when you are awake) or until the swelling goes down. Put a thin cloth between the ice and your skin. · Wrap the injury in an elastic bandage. Do not wrap it too tightly because this can cause more swelling. · Prop up the sore joint on a pillow when you ice it or anytime you sit or lie down during the next 3 days. Try to keep it above the level of your heart. This will help reduce swelling. · Take an over-the-counter pain medicine, such as acetaminophen (Tylenol), ibuprofen (Advil, Motrin), or naproxen (Aleve). Read and follow all instructions on the label. · After 1 or 2 days of rest, begin moving the joint gently. While the joint is still healing, you can begin to exercise using activities that do not strain or hurt the painful joint. When should you call for help? Call your doctor now or seek immediate medical care if:    · You have signs of infection, such as:  ? Increased pain, swelling, warmth, and redness. ? Red streaks leading from the joint. ? A fever.    Watch closely for changes in your health, and be sure to contact your doctor if:    · Your movement or symptoms are not getting better after 1 to 2 weeks of home treatment. Where can you learn more? Go to http://chiki-kervin.info/. Enter P205 in the search box to learn more about \"Joint Pain: Care Instructions. \"  Current as of: November 29, 2017  Content Version: 11.8  © 7960-7538 [employer].  Care instructions adapted under license by [address] (which disclaims liability or warranty for this information). If you have questions about a medical condition or this instruction, always ask your healthcare professional. [address] any warranty or liability for your use of this information. Knee Pain or Injury: Care Instructions  Your Care Instructions    Injuries are a common cause of knee problems. Sudden (acute) injuries may be caused by a direct blow to the knee. They can also be caused by abnormal twisting, bending, or falling on the knee. Pain, bruising, or swelling may be severe, and may start within minutes of the injury. Overuse is another cause of knee pain. Other causes are climbing stairs, kneeling, and other activities that use the knee. Everyday wear and tear, especially as you get older, also can cause knee pain. Rest, along with home treatment, often relieves pain and allows your knee to heal. If you have a serious knee injury, you may need tests and treatment. Follow-up care is a key part of your treatment and safety. Be sure to make and go to all appointments, and call your doctor if you are having problems. It's also a good idea to know your test results and keep a list of the medicines you take. How can you care for yourself at home? · Be safe with medicines. Read and follow all instructions on the label. ? If the doctor gave you a prescription medicine for pain, take it as prescribed. ? If you are not taking a prescription pain medicine, ask your doctor if you can take an over-the-counter medicine. · Rest and protect your knee. Take a break from any activity that may cause pain. · Put ice or a cold pack on your knee for 10 to 20 minutes at a time. Put a thin cloth between the ice and your skin. · Prop up a sore knee on a pillow when you ice it or anytime you sit or lie down for the next 3 days. Try to keep it above the level of your heart.  This will help reduce swelling. · If your knee is not swollen, you can put moist heat, a heating pad, or a warm cloth on your knee. · If your doctor recommends an elastic bandage, sleeve, or other type of support for your knee, wear it as directed. · Follow your doctor's instructions about how much weight you can put on your leg. Use a cane, crutches, or a walker as instructed. · Follow your doctor's instructions about activity during your healing process. If you can do mild exercise, slowly increase your activity. · Reach and stay at a healthy weight. Extra weight can strain the joints, especially the knees and hips, and make the pain worse. Losing even a few pounds may help. When should you call for help? Call 911 anytime you think you may need emergency care. For example, call if:    · You have symptoms of a blood clot in your lung (called a pulmonary embolism). These may include:  ? Sudden chest pain. ? Trouble breathing. ? Coughing up blood.    Call your doctor now or seek immediate medical care if:    · You have severe or increasing pain.     · Your leg or foot turns cold or changes color.     · You cannot stand or put weight on your knee.     · Your knee looks twisted or bent out of shape.     · You cannot move your knee.     · You have signs of infection, such as:  ? Increased pain, swelling, warmth, or redness. ? Red streaks leading from the knee. ? Pus draining from a place on your knee. ? A fever.     · You have signs of a blood clot in your leg (called a deep vein thrombosis), such as:  ? Pain in your calf, back of the knee, thigh, or groin. ? Redness and swelling in your leg or groin.    Watch closely for changes in your health, and be sure to contact your doctor if:    · You have tingling, weakness, or numbness in your knee.     · You have any new symptoms, such as swelling.     · You have bruises from a knee injury that last longer than 2 weeks.     · You do not get better as expected.    Where can you learn more?  Go to http://chiki-kervin.info/. Enter K195 in the search box to learn more about \"Knee Pain or Injury: Care Instructions. \"  Current as of: November 20, 2017  Content Version: 11.8  © 4046-4826 [employer], [employer]. Care instructions adapted under license by [employer] (which disclaims liability or warranty for this information). If you have questions about a medical condition or this instruction, always ask your healthcare professional. [place] [address] any warranty or liability for your use of this information.

## 2018-12-16 ENCOUNTER — APPOINTMENT (OUTPATIENT)
Dept: GENERAL RADIOLOGY | Age: 38
End: 2018-12-16
Attending: EMERGENCY MEDICINE
Payer: SELF-PAY

## 2018-12-16 ENCOUNTER — HOSPITAL ENCOUNTER (EMERGENCY)
Age: 38
Discharge: HOME | End: 2018-12-16
Attending: EMERGENCY MEDICINE
Payer: SELF-PAY

## 2018-12-16 VITALS
RESPIRATION RATE: 16 BRPM | TEMPERATURE: 98.8 F | OXYGEN SATURATION: 100 % | HEART RATE: 76 BPM | HEIGHT: 66 IN | DIASTOLIC BLOOD PRESSURE: 58 MMHG | WEIGHT: 190 LBS | BODY MASS INDEX: 30.53 KG/M2 | SYSTOLIC BLOOD PRESSURE: 107 MMHG

## 2018-12-16 DIAGNOSIS — R06.02 SOB (SHORTNESS OF BREATH): ICD-10-CM

## 2018-12-16 DIAGNOSIS — R07.89 CHEST TIGHTNESS: Primary | ICD-10-CM

## 2018-12-16 LAB
ALBUMIN SERPL-MCNC: 4 G/DL (ref 3.4–5)
ALBUMIN/GLOB SERPL: 1 {RATIO} (ref 0.8–1.7)
ALP SERPL-CCNC: 51 U/L (ref 45–117)
ALT SERPL-CCNC: 21 U/L (ref 13–56)
ANION GAP SERPL CALC-SCNC: 11 MMOL/L (ref 3–18)
AST SERPL-CCNC: 15 U/L (ref 15–37)
BASOPHILS # BLD: 0 K/UL (ref 0–0.1)
BASOPHILS NFR BLD: 0 % (ref 0–2)
BILIRUB SERPL-MCNC: 0.6 MG/DL (ref 0.2–1)
BUN SERPL-MCNC: 9 MG/DL (ref 7–18)
BUN/CREAT SERPL: 13 (ref 12–20)
CALCIUM SERPL-MCNC: 8.9 MG/DL (ref 8.5–10.1)
CHLORIDE SERPL-SCNC: 106 MMOL/L (ref 100–108)
CK MB CFR SERPL CALC: NORMAL % (ref 0–4)
CK MB SERPL-MCNC: <1 NG/ML (ref 5–25)
CK SERPL-CCNC: 158 U/L (ref 26–192)
CO2 SERPL-SCNC: 21 MMOL/L (ref 21–32)
CREAT SERPL-MCNC: 0.69 MG/DL (ref 0.6–1.3)
D DIMER PPP FEU-MCNC: 0.44 UG/ML(FEU)
DIFFERENTIAL METHOD BLD: ABNORMAL
EOSINOPHIL # BLD: 0.1 K/UL (ref 0–0.4)
EOSINOPHIL NFR BLD: 1 % (ref 0–5)
ERYTHROCYTE [DISTWIDTH] IN BLOOD BY AUTOMATED COUNT: 19.7 % (ref 11.6–14.5)
GLOBULIN SER CALC-MCNC: 4 G/DL (ref 2–4)
GLUCOSE SERPL-MCNC: 89 MG/DL (ref 74–99)
HCG SERPL QL: NEGATIVE
HCT VFR BLD AUTO: 26.9 % (ref 35–45)
HGB BLD-MCNC: 8.6 G/DL (ref 12–16)
LYMPHOCYTES # BLD: 2.7 K/UL (ref 0.9–3.6)
LYMPHOCYTES NFR BLD: 36 % (ref 21–52)
MCH RBC QN AUTO: 18.5 PG (ref 24–34)
MCHC RBC AUTO-ENTMCNC: 32 G/DL (ref 31–37)
MCV RBC AUTO: 57.8 FL (ref 74–97)
MONOCYTES # BLD: 0.4 K/UL (ref 0.05–1.2)
MONOCYTES NFR BLD: 6 % (ref 3–10)
NEUTS SEG # BLD: 4.4 K/UL (ref 1.8–8)
NEUTS SEG NFR BLD: 57 % (ref 40–73)
PLATELET # BLD AUTO: 387 K/UL (ref 135–420)
PMV BLD AUTO: 8.7 FL (ref 9.2–11.8)
POTASSIUM SERPL-SCNC: 3.1 MMOL/L (ref 3.5–5.5)
PROT SERPL-MCNC: 8 G/DL (ref 6.4–8.2)
RBC # BLD AUTO: 4.65 M/UL (ref 4.2–5.3)
SODIUM SERPL-SCNC: 138 MMOL/L (ref 136–145)
TROPONIN I SERPL-MCNC: <0.02 NG/ML (ref 0–0.04)
WBC # BLD AUTO: 7.6 K/UL (ref 4.6–13.2)

## 2018-12-16 PROCEDURE — 93005 ELECTROCARDIOGRAM TRACING: CPT

## 2018-12-16 PROCEDURE — 80053 COMPREHEN METABOLIC PANEL: CPT

## 2018-12-16 PROCEDURE — 74011250637 HC RX REV CODE- 250/637: Performed by: PHYSICIAN ASSISTANT

## 2018-12-16 PROCEDURE — 85379 FIBRIN DEGRADATION QUANT: CPT

## 2018-12-16 PROCEDURE — 84703 CHORIONIC GONADOTROPIN ASSAY: CPT

## 2018-12-16 PROCEDURE — 99284 EMERGENCY DEPT VISIT MOD MDM: CPT

## 2018-12-16 PROCEDURE — 85025 COMPLETE CBC W/AUTO DIFF WBC: CPT

## 2018-12-16 PROCEDURE — 82553 CREATINE MB FRACTION: CPT

## 2018-12-16 PROCEDURE — 71046 X-RAY EXAM CHEST 2 VIEWS: CPT

## 2018-12-16 RX ORDER — HYDROXYZINE PAMOATE 25 MG/1
25 CAPSULE ORAL
Status: COMPLETED | OUTPATIENT
Start: 2018-12-16 | End: 2018-12-16

## 2018-12-16 RX ORDER — CYCLOBENZAPRINE HCL 10 MG
10 TABLET ORAL
Status: COMPLETED | OUTPATIENT
Start: 2018-12-16 | End: 2018-12-16

## 2018-12-16 RX ORDER — OMEPRAZOLE 40 MG/1
40 CAPSULE, DELAYED RELEASE ORAL DAILY
Qty: 30 CAP | Refills: 0 | Status: SHIPPED | OUTPATIENT
Start: 2018-12-16 | End: 2020-03-26

## 2018-12-16 RX ORDER — HYDROXYZINE 25 MG/1
25 TABLET, FILM COATED ORAL
Qty: 10 TAB | Refills: 0 | Status: SHIPPED | OUTPATIENT
Start: 2018-12-16 | End: 2018-12-26

## 2018-12-16 RX ADMIN — HYDROXYZINE PAMOATE 25 MG: 25 CAPSULE ORAL at 23:45

## 2018-12-16 RX ADMIN — CYCLOBENZAPRINE HYDROCHLORIDE 10 MG: 10 TABLET, FILM COATED ORAL at 23:45

## 2018-12-16 NOTE — LETTER
[address] [facility] EMERGENCY DEPT 
[facility] [address] [id number] 
[phone] Work/School Note Date: 12/16/2018 To Whom It May concern: 
 
Jessica Talley was seen and treated today in the emergency room by the following provider(s): 
Attending Provider: Myla Pinzon MD 
Physician Assistant: Johnie Alvarez PA-C. Jessica Talley  May return to work on 12/18/2018 or sooner if symptoms markedly improve. Sincerely, Alejandro Manuel PA-C

## 2018-12-17 LAB
ATRIAL RATE: 73 BPM
CALCULATED P AXIS, ECG09: 75 DEGREES
CALCULATED R AXIS, ECG10: 68 DEGREES
CALCULATED T AXIS, ECG11: 44 DEGREES
DIAGNOSIS, 93000: NORMAL
P-R INTERVAL, ECG05: 178 MS
Q-T INTERVAL, ECG07: 400 MS
QRS DURATION, ECG06: 82 MS
QTC CALCULATION (BEZET), ECG08: 440 MS
VENTRICULAR RATE, ECG03: 73 BPM

## 2018-12-17 NOTE — ED NOTES
11:49 PM  12/16/18     Discharge instructions given to patient (name) with verbalization of understanding. Patient accompanied by friend. Patient discharged with the following prescriptions Atarax, prilosec. Patient discharged to home (destination).       Shade Contreras RN

## 2018-12-17 NOTE — ED PROVIDER NOTES
EMERGENCY DEPARTMENT HISTORY AND PHYSICAL EXAM    Date: 12/16/2018  Patient Name: Oh Mullen    History of Presenting Illness     Chief Complaint   Patient presents with    Chest Pain    Shortness of Breath    Dizziness         History Provided By: Patient    Chief Complaint: chest pain  Duration: 1 Weeks  Timing:  Gradual and Intermittent  Location: center of chest  Quality: Aching, Burning and Sharp  Severity: Moderate  Modifying Factors: none  Associated Symptoms: shortness of breath, chest tightness, increased anxiety      HPI: Oh Mullen is a 41 y.o. female with a PMH of anemia who presents to the ER c/o chest pain. Patient states her symptoms began about 1 week ago and have been intermittent in nature. She also has had some associated shortness of breath and chest tightness. Patient reports a recent URI that has improved. She recently started a new job and has had increased stress. She denied tobacco use, recent travel, and is not on birth control. LMP was 3 weeks prior. She has a hx of GERD, but does not take any daily meds. She denied all other symptoms or complaints. PCP: None    Current Outpatient Medications   Medication Sig Dispense Refill    hydrOXYzine HCl (ATARAX) 25 mg tablet Take 1 Tab by mouth every six (6) hours as needed for Anxiety for up to 10 days. 10 Tab 0    omeprazole (PRILOSEC) 40 mg capsule Take 1 Cap by mouth daily. 30 Cap 0       Past History     Past Medical History:  Past Medical History:   Diagnosis Date    Anemia        Past Surgical History:  History reviewed. No pertinent surgical history. Family History:  History reviewed. No pertinent family history. Social History:  Social History     Tobacco Use    Smoking status: Never Smoker   Substance Use Topics    Alcohol use: No    Drug use: No       Allergies:  No Known Allergies      Review of Systems   Review of Systems   Constitutional: Negative for chills, fatigue and fever.    HENT: Negative. Negative for sore throat. Eyes: Negative. Respiratory: Positive for chest tightness and shortness of breath. Negative for cough. Cardiovascular: Positive for chest pain. Negative for palpitations. Gastrointestinal: Negative for abdominal pain, nausea and vomiting. Genitourinary: Negative for dysuria. Musculoskeletal: Negative. Skin: Negative. Neurological: Negative for dizziness, weakness, light-headedness and headaches. Psychiatric/Behavioral: Negative. All other systems reviewed and are negative. Physical Exam     Vitals:    12/16/18 2044 12/16/18 2312   BP: (!) 151/105 107/68   Pulse: 78 72   Resp: 17 16   Temp: 98.8 °F (37.1 °C)    SpO2: 100% 100%   Weight: 86.2 kg (190 lb)    Height: 5' 6\" (1.676 m)      Physical Exam   Constitutional: She is oriented to person, place, and time. She appears well-developed and well-nourished. No distress. HENT:   Head: Normocephalic and atraumatic. Mouth/Throat: Oropharynx is clear and moist.   Eyes: Conjunctivae are normal. No scleral icterus. Neck: Neck supple. No JVD present. No tracheal deviation present. Cardiovascular: Normal rate, regular rhythm and normal heart sounds. No murmur heard. Pulmonary/Chest: Effort normal and breath sounds normal. No respiratory distress. She has no wheezes. She has no rales. She exhibits no tenderness. Speaking in full sentences   Abdominal: Soft. Bowel sounds are normal. She exhibits no distension. There is no tenderness. There is no rebound and no guarding. Musculoskeletal: Normal range of motion. Neurological: She is alert and oriented to person, place, and time. She has normal strength. Gait normal. GCS eye subscore is 4. GCS verbal subscore is 5. GCS motor subscore is 6. Skin: Skin is warm and dry. She is not diaphoretic. Psychiatric: She has a normal mood and affect. Nursing note and vitals reviewed.         Diagnostic Study Results     Labs -     Recent Results (from the past 12 hour(s))   EKG, 12 LEAD, INITIAL    Collection Time: 12/16/18  8:34 PM   Result Value Ref Range    Ventricular Rate 73 BPM    Atrial Rate 73 BPM    P-R Interval 178 ms    QRS Duration 82 ms    Q-T Interval 400 ms    QTC Calculation (Bezet) 440 ms    Calculated P Axis 75 degrees    Calculated R Axis 68 degrees    Calculated T Axis 44 degrees    Diagnosis       Normal sinus rhythm  Nonspecific T wave abnormality  Abnormal ECG  When compared with ECG of 11-MAR-2018 11:56,  Nonspecific T wave abnormality now evident in Anterior leads     CBC WITH AUTOMATED DIFF    Collection Time: 12/16/18  9:10 PM   Result Value Ref Range    WBC 7.6 4.6 - 13.2 K/uL    RBC 4.65 4.20 - 5.30 M/uL    HGB 8.6 (L) 12.0 - 16.0 g/dL    HCT 26.9 (L) 35.0 - 45.0 %    MCV 57.8 (L) 74.0 - 97.0 FL    MCH 18.5 (L) 24.0 - 34.0 PG    MCHC 32.0 31.0 - 37.0 g/dL    RDW 19.7 (H) 11.6 - 14.5 %    PLATELET 582 327 - 193 K/uL    MPV 8.7 (L) 9.2 - 11.8 FL    NEUTROPHILS 57 40 - 73 %    LYMPHOCYTES 36 21 - 52 %    MONOCYTES 6 3 - 10 %    EOSINOPHILS 1 0 - 5 %    BASOPHILS 0 0 - 2 %    ABS. NEUTROPHILS 4.4 1.8 - 8.0 K/UL    ABS. LYMPHOCYTES 2.7 0.9 - 3.6 K/UL    ABS. MONOCYTES 0.4 0.05 - 1.2 K/UL    ABS. EOSINOPHILS 0.1 0.0 - 0.4 K/UL    ABS.  BASOPHILS 0.0 0.0 - 0.1 K/UL    DF AUTOMATED     CARDIAC PANEL,(CK, CKMB & TROPONIN)    Collection Time: 12/16/18  9:10 PM   Result Value Ref Range     26 - 192 U/L    CK - MB <1.0 <3.6 ng/ml    CK-MB Index  0.0 - 4.0 %     CALCULATION NOT PERFORMED WHEN RESULT IS BELOW LINEAR LIMIT    Troponin-I, Qt. <0.02 0.0 - 8.195 NG/ML   METABOLIC PANEL, COMPREHENSIVE    Collection Time: 12/16/18  9:10 PM   Result Value Ref Range    Sodium 138 136 - 145 mmol/L    Potassium 3.1 (L) 3.5 - 5.5 mmol/L    Chloride 106 100 - 108 mmol/L    CO2 21 21 - 32 mmol/L    Anion gap 11 3.0 - 18 mmol/L    Glucose 89 74 - 99 mg/dL    BUN 9 7.0 - 18 MG/DL    Creatinine 0.69 0.6 - 1.3 MG/DL    BUN/Creatinine ratio 13 12 - 20      GFR est AA >60 >60 ml/min/1.73m2    GFR est non-AA >60 >60 ml/min/1.73m2    Calcium 8.9 8.5 - 10.1 MG/DL    Bilirubin, total 0.6 0.2 - 1.0 MG/DL    ALT (SGPT) 21 13 - 56 U/L    AST (SGOT) 15 15 - 37 U/L    Alk. phosphatase 51 45 - 117 U/L    Protein, total 8.0 6.4 - 8.2 g/dL    Albumin 4.0 3.4 - 5.0 g/dL    Globulin 4.0 2.0 - 4.0 g/dL    A-G Ratio 1.0 0.8 - 1.7     HCG QL SERUM    Collection Time: 12/16/18  9:10 PM   Result Value Ref Range    HCG, Ql. NEGATIVE  NEG     D DIMER    Collection Time: 12/16/18  9:10 PM   Result Value Ref Range    D DIMER 0.44 <0.46 ug/ml(FEU)       Radiologic Studies -   XR CHEST PA LAT    (Results Pending)     CT Results  (Last 48 hours)    None        CXR Results  (Last 48 hours)    None            Medical Decision Making   I am the first provider for this patient. I reviewed the vital signs, available nursing notes, past medical history, past surgical history, family history and social history. Vital Signs-Reviewed the patient's vital signs. Records Reviewed: Nursing Notes, Old Medical Records and Previous electrocardiograms     11:35 PM  42 y/o female c/o intermittent chest pain, tightness and shortness of breath x 1 week. States she recently started a new job, and does a lot of lifting and heaving moving. Also recovering from recent URI. No recent fevers, chills. Does not smoke, not on hormones and no recent travel. Labs noted to show mild hypokalemia. Cardiac work up negative. D dimer wnl. No acute process on cxr. Discussed all results with pt. No clinical indication for further imaging at this time. Pt states under a lot of stress, and may be her anxiety. Advised to f/u with PCP if symptoms persist.  All questions answered and patient in agreement with plan of care. Will plan for discharge. Aretha Moeller PA-C       Disposition:  Discharged    DISCHARGE NOTE:       Care plan outlined and precautions discussed.   Patient has no new complaints, changes, or physical findings. Results of labs, ekg, cxr were reviewed with the patient. All medications were reviewed with the patient; will d/c home with hydroxyzine. All of pt's questions and concerns were addressed. Patient was instructed and agrees to follow up with pcp, as well as to return to the ED upon further deterioration. Patient is ready to go home. Follow-up Information     Follow up With Specialties Details Why [address] EMERGENCY DEPT Emergency Medicine  If symptoms worsen; be sure to follow up with your primary care as discussed [address]  [phone]          Current Discharge Medication List      START taking these medications    Details   hydrOXYzine HCl (ATARAX) 25 mg tablet Take 1 Tab by mouth every six (6) hours as needed for Anxiety for up to 10 days. Qty: 10 Tab, Refills: 0      omeprazole (PRILOSEC) 40 mg capsule Take 1 Cap by mouth daily. Qty: 30 Cap, Refills: 0             Provider Notes (Medical Decision Making):     Procedures:  Procedures        Diagnosis     Clinical Impression:   1. Chest tightness    2.  SOB (shortness of breath)

## 2018-12-17 NOTE — DISCHARGE INSTRUCTIONS
Musculoskeletal Chest Pain: Care Instructions  Your Care Instructions    Chest pain is not always a sign that something is wrong with your heart or that you have another serious problem. The doctor thinks your chest pain is caused by strained muscles or ligaments, inflamed chest cartilage, or another problem in your chest, rather than by your heart. You may need more tests to find the cause of your chest pain. Follow-up care is a key part of your treatment and safety. Be sure to make and go to all appointments, and call your doctor if you are having problems. It's also a good idea to know your test results and keep a list of the medicines you take. How can you care for yourself at home? · Take pain medicines exactly as directed. ? If the doctor gave you a prescription medicine for pain, take it as prescribed. ? If you are not taking a prescription pain medicine, ask your doctor if you can take an over-the-counter medicine. · Rest and protect the sore area. · Stop, change, or take a break from any activity that may be causing your pain or soreness. · Put ice or a cold pack on the sore area for 10 to 20 minutes at a time. Try to do this every 1 to 2 hours for the next 3 days (when you are awake) or until the swelling goes down. Put a thin cloth between the ice and your skin. · After 2 or 3 days, apply a heating pad set on low or a warm cloth to the area that hurts. Some doctors suggest that you go back and forth between hot and cold. · Do not wrap or tape your ribs for support. This may cause you to take smaller breaths, which could increase your risk of lung problems. · Mentholated creams such as Bengay or Icy Hot may soothe sore muscles. Follow the instructions on the package. · Follow your doctor's instructions for exercising. · Gentle stretching and massage may help you get better faster. Stretch slowly to the point just before pain begins, and hold the stretch for at least 15 to 30 seconds.  Do this 3 or 4 times a day. Stretch just after you have applied heat. · As your pain gets better, slowly return to your normal activities. Any increased pain may be a sign that you need to rest a while longer. When should you call for help? Call 911 anytime you think you may need emergency care. For example, call if:    · You have chest pain or pressure. This may occur with:  ? Sweating. ? Shortness of breath. ? Nausea or vomiting. ? Pain that spreads from the chest to the neck, jaw, or one or both shoulders or arms. ? Dizziness or lightheadedness. ? A fast or uneven pulse. After calling 911, chew 1 adult-strength aspirin. Wait for an ambulance. Do not try to drive yourself.     · You have sudden chest pain and shortness of breath, or you cough up blood.    Call your doctor now or seek immediate medical care if:    · You have any trouble breathing.     · Your chest pain gets worse.     · Your chest pain occurs consistently with exercise and is relieved by rest.    Watch closely for changes in your health, and be sure to contact your doctor if:    · Your chest pain does not get better after 1 week. Where can you learn more? Go to http://chiki-kervin.info/. Enter V293 in the search box to learn more about \"Musculoskeletal Chest Pain: Care Instructions. \"  Current as of: November 20, 2017  Content Version: 11.8  © 8241-8000 [employer]. Care instructions adapted under license by [employer] (which disclaims liability or warranty for this information). If you have questions about a medical condition or this instruction, always ask your healthcare professional. [place] [address] any warranty or liability for your use of this information.

## 2018-12-28 ENCOUNTER — APPOINTMENT (OUTPATIENT)
Dept: GENERAL RADIOLOGY | Age: 38
End: 2018-12-28
Attending: PHYSICIAN ASSISTANT
Payer: SELF-PAY

## 2018-12-28 ENCOUNTER — HOSPITAL ENCOUNTER (EMERGENCY)
Age: 38
Discharge: HOME | End: 2018-12-28
Attending: EMERGENCY MEDICINE
Payer: SELF-PAY

## 2018-12-28 VITALS
DIASTOLIC BLOOD PRESSURE: 67 MMHG | WEIGHT: 190 LBS | SYSTOLIC BLOOD PRESSURE: 115 MMHG | TEMPERATURE: 97.8 F | HEIGHT: 66 IN | BODY MASS INDEX: 30.53 KG/M2 | RESPIRATION RATE: 20 BRPM | OXYGEN SATURATION: 100 % | HEART RATE: 70 BPM

## 2018-12-28 DIAGNOSIS — S93.402A SPRAIN OF LEFT ANKLE, UNSPECIFIED LIGAMENT, INITIAL ENCOUNTER: Primary | ICD-10-CM

## 2018-12-28 PROCEDURE — 73630 X-RAY EXAM OF FOOT: CPT

## 2018-12-28 PROCEDURE — 99283 EMERGENCY DEPT VISIT LOW MDM: CPT

## 2018-12-28 NOTE — DISCHARGE INSTRUCTIONS
Ankle Sprain: Care Instructions  Your Care Instructions    An ankle sprain can happen when you twist your ankle. The ligaments that support the ankle can get stretched and torn. Often the ankle is swollen and painful. Ankle sprains may take from several weeks to several months to heal. Usually, the more pain and swelling you have, the more severe your ankle sprain is and the longer it will take to heal. You can heal faster and regain strength in your ankle with good home treatment. It is very important to give your ankle time to heal completely, so that you do not easily hurt your ankle again. Follow-up care is a key part of your treatment and safety. Be sure to make and go to all appointments, and call your doctor if you are having problems. It's also a good idea to know your test results and keep a list of the medicines you take. How can you care for yourself at home? · Prop up your foot on pillows as much as possible for the next 3 days. Try to keep your ankle above the level of your heart. This will help reduce the swelling. · Follow your doctor's directions for wearing a splint or elastic bandage. Wrapping the ankle may help reduce or prevent swelling. · Your doctor may give you a splint, a brace, an air stirrup, or another form of ankle support to protect your ankle until it is healed. Wear it as directed while your ankle is healing. Do not remove it unless your doctor tells you to. After your ankle has healed, ask your doctor whether you should wear the brace when you exercise. · Put ice or cold packs on your injured ankle for 10 to 20 minutes at a time. Try to do this every 1 to 2 hours for the next 3 days (when you are awake) or until the swelling goes down. Put a thin cloth between the ice and your skin. · You may need to use crutches until you can walk without pain. If you do use crutches, try to bear some weight on your injured ankle if you can do so without pain.  This helps the ankle heal.  · Take pain medicines exactly as directed. ? If the doctor gave you a prescription medicine for pain, take it as prescribed. ? If you are not taking a prescription pain medicine, ask your doctor if you can take an over-the-counter medicine. · If you have been given ankle exercises to do at home, do them exactly as instructed. These can promote healing and help prevent lasting weakness. When should you call for help? Call your doctor now or seek immediate medical care if:    · Your pain is getting worse.     · Your swelling is getting worse.     · Your splint feels too tight or you are unable to loosen it.    Watch closely for changes in your health, and be sure to contact your doctor if:    · You are not getting better after 1 week. Where can you learn more? Go to http://chiki-kervin.info/. Enter V540 in the search box to learn more about \"Ankle Sprain: Care Instructions. \"  Current as of: November 29, 2017  Content Version: 11.8  © 4000-0482 Healthwise[employer]. Care instructions adapted under license by [employer] (which disclaims liability or warranty for this information). If you have questions about a medical condition or this instruction, always ask your healthcare professional. [address] any warranty or liability for your use of this information.

## 2018-12-28 NOTE — LETTER
NOTIFICATION RETURN TO WORK / SCHOOL 
 
12/28/2018 7:24 AM 
 
Ms. Macarena Art [address] [facility] [address] To Whom It May Concern: 
 
Macarena Art is currently under the care of [facility] EMERGENCY DEPT. She will return to work/school on: 12/31/18 If there are questions or concerns please have the patient contact our office. Sincerely, Lynette Sandoval PA-C

## 2018-12-28 NOTE — ED PROVIDER NOTES
Pt reports twisting ankle yesterday. Has been able to ambulate with some pain. No other complaints. No meds taken for sx The history is provided by the patient. Ankle swelling This is a new problem. The current episode started yesterday. The problem occurs constantly. The problem has not changed since onset. The pain is present in the left ankle. The quality of the pain is described as aching and dull. The pain is moderate. Pertinent negatives include no numbness, full range of motion, no stiffness, no tingling, no itching and no back pain. The symptoms are aggravated by palpation, movement and activity. She has tried nothing for the symptoms. There has been a history of trauma. Past Medical History:  
Diagnosis Date  Anemia No past surgical history on file. No family history on file. Social History Socioeconomic History  Marital status:  Spouse name: Not on file  Number of children: Not on file  Years of education: Not on file  Highest education level: Not on file Social Needs  Financial resource strain: Not on file  Food insecurity - worry: Not on file  Food insecurity - inability: Not on file  Transportation needs - medical: Not on file  Transportation needs - non-medical: Not on file Occupational History  Not on file Tobacco Use  Smoking status: Never Smoker Substance and Sexual Activity  Alcohol use: No  
 Drug use: No  
 Sexual activity: Not on file Other Topics Concern  Not on file Social History Narrative  Not on file ALLERGIES: Patient has no known allergies. Review of Systems Musculoskeletal: Positive for arthralgias and joint swelling. Negative for back pain and stiffness. Skin: Negative for itching. Neurological: Negative for tingling and numbness. All other systems reviewed and are negative. Vitals:  
 12/28/18 8113 BP: 116/69 Pulse: 70 Resp: 20  
 Temp: 97.8 °F (36.6 °C) SpO2: 100% Weight: 86.2 kg (190 lb) Height: 5' 6\" (1.676 m) Physical Exam  
Constitutional: She is oriented to person, place, and time. She appears well-developed and well-nourished. No distress. NAD, well hydrated, non toxic HENT:  
Head: Normocephalic and atraumatic. Nose: Nose normal.  
Mouth/Throat: Oropharynx is clear and moist. No oropharyngeal exudate. Eyes: Conjunctivae and EOM are normal. Pupils are equal, round, and reactive to light. Neck: Normal range of motion. Neck supple. Cardiovascular: Normal rate, regular rhythm and normal heart sounds. No murmur heard. Pulmonary/Chest: Effort normal and breath sounds normal. No respiratory distress. She has no wheezes. She has no rales. Abdominal: Soft. She exhibits no distension. There is no tenderness. There is no guarding. Musculoskeletal: Normal range of motion. Left ankle: Tenderness. Lateral malleolus tenderness found. No medial malleolus, no AITFL, no CF ligament, no posterior TFL, no head of 5th metatarsal and no proximal fibula tenderness found. Achilles tendon normal.  
Lymphadenopathy:  
  She has no cervical adenopathy. Neurological: She is alert and oriented to person, place, and time. No cranial nerve deficit. Coordination normal.  
Skin: Skin is warm. No rash noted. She is not diaphoretic. Psychiatric: She has a normal mood and affect. Her behavior is normal.  
Nursing note and vitals reviewed. MDM Number of Diagnoses or Management Options Sprain of left ankle, unspecified ligament, initial encounter:  
Diagnosis management comments: Xray reviewed, NAP appreciated by self- no fracture, no dislocation. NVI. Will refer to ortho and give work note Amount and/or Complexity of Data Reviewed Tests in the radiology section of CPT®: reviewed and ordered Procedures Medications ordered:  
Medications - No data to display Lab findings: No results found for this or any previous visit (from the past 12 hour(s)). EKG interpretation per No att. providers found : 
 
X-Ray, CT or other radiology findings or impressions: 
Xr Foot Lt Min 3 V Result Date: 12/28/2018 EXAM: Left Foot X-ray INDICATION: Fell down stairs and has pain. TECHNIQUE: 3 views of the left foot obtained. COMPARISON: None. FINDINGS: The bone density is grossly unremarkable. The alignment of the foot is unremarkable. No evidence of acute fracture or dislocation. Mild first MTP osteoarthritis. Capsular ossification at the talonavicular joint are noted. There is no evidence of erosions or periostitis. No lytic or blastic focus appreciated. The soft tissues are unremarkable. IMPRESSION: 1. No evidence of acute fracture or dislocation. 2.   Mild first MTP osteoarthritis. Progress notes, Consult notes or additional Procedure notes: Will treat as sprain Dispo: 
Patient was d/c in stable condition. Return to the ER if you are unable to obtain referral as directed. 
  
  
Atul Lim 's  results have been reviewed with her. They have been counseled regarding the diagnosis, treatment, and plan. They verbally convey understanding and agreement of the signs, symptoms, diagnosis, treatment and prognosis and additionally agrees to follow up as discussed. They also agrees with the care-plan and conveys that all of her questions have been answered. I have also provided discharge instructions for her that include: educational information regarding their diagnosis and treatment, and list of reasons why they would want to return to the ED prior to their follow-up appointment, should the condition change. Evelin Arias PA-C Diagnosis: 1. Sprain of left ankle, unspecified ligament, initial encounter Follow-up Information Follow up With Specialties Details Why Contact Info  [address], [address] [facility] [address] [facility] [address] [facility], [address] [facility] [address] 
[phone] [facility] EMERGENCY DEPT Emergency Medicine   [address] [id number] 
[phone] Medication List  
  
ASK your doctor about these medications   
omeprazole 40 mg capsule Commonly known as:  PRILOSEC Take 1 Cap by mouth daily.

## 2019-06-12 ENCOUNTER — HOSPITAL ENCOUNTER (EMERGENCY)
Age: 39
Discharge: HOME | End: 2019-06-12
Attending: EMERGENCY MEDICINE
Payer: SELF-PAY

## 2019-06-12 ENCOUNTER — APPOINTMENT (OUTPATIENT)
Dept: GENERAL RADIOLOGY | Age: 39
End: 2019-06-12
Attending: NURSE PRACTITIONER
Payer: SELF-PAY

## 2019-06-12 VITALS
TEMPERATURE: 97.1 F | DIASTOLIC BLOOD PRESSURE: 87 MMHG | OXYGEN SATURATION: 100 % | HEIGHT: 66 IN | HEART RATE: 70 BPM | SYSTOLIC BLOOD PRESSURE: 134 MMHG | RESPIRATION RATE: 16 BRPM | BODY MASS INDEX: 30.53 KG/M2 | WEIGHT: 190 LBS

## 2019-06-12 DIAGNOSIS — R03.0 ELEVATED BLOOD PRESSURE READING: ICD-10-CM

## 2019-06-12 DIAGNOSIS — S62.619A CLOSED AVULSION FRACTURE OF PROXIMAL PHALANX OF FINGER, INITIAL ENCOUNTER: Primary | ICD-10-CM

## 2019-06-12 DIAGNOSIS — M25.562 CHRONIC PAIN OF LEFT KNEE: ICD-10-CM

## 2019-06-12 DIAGNOSIS — G89.29 CHRONIC PAIN OF LEFT KNEE: ICD-10-CM

## 2019-06-12 PROCEDURE — 99283 EMERGENCY DEPT VISIT LOW MDM: CPT

## 2019-06-12 PROCEDURE — 74011250637 HC RX REV CODE- 250/637: Performed by: NURSE PRACTITIONER

## 2019-06-12 PROCEDURE — 77030008323 HC SPLNT FNGR GTR DJOR -A

## 2019-06-12 PROCEDURE — 73140 X-RAY EXAM OF FINGER(S): CPT

## 2019-06-12 RX ORDER — IBUPROFEN 400 MG/1
800 TABLET ORAL
Status: COMPLETED | OUTPATIENT
Start: 2019-06-12 | End: 2019-06-12

## 2019-06-12 RX ADMIN — IBUPROFEN 800 MG: 400 TABLET ORAL at 09:45

## 2019-06-12 NOTE — DISCHARGE INSTRUCTIONS
Patient Education        Finger Fracture: Care Instructions  Your Care Instructions    Breaks in the bones of the finger usually heal well in about 3 to 4 weeks. The pain and swelling from a broken finger can last for weeks. But it should steadily improve, starting a few days after you break it. It is very important that you wear and take care of the cast or splint exactly as your doctor tells you to so that your finger heals properly and does not end up crooked. Wearing a splint may interfere with your normal activities. Ask for help with daily tasks if you need it. You heal best when you take good care of yourself. Eat a variety of healthy foods, and don't smoke. Follow-up care is a key part of your treatment and safety. Be sure to make and go to all appointments, and call your doctor if you are having problems. It's also a good idea to know your test results and keep a list of the medicines you take. How can you care for yourself at home? · If your doctor put a splint on your finger, wear the splint exactly as directed. Do not remove it until your doctor says that you can. · Keep your hand raised above the level of your heart as much as you can. This will help reduce swelling. · Put ice or a cold pack on your finger for 10 to 20 minutes at a time. Try to do this every 1 to 2 hours for the next 3 days (when you are awake) or until the swelling goes down. Put a thin cloth between the ice and your skin. Keep the splint dry. · Be safe with medicines. Take pain medicines exactly as directed. ? If the doctor gave you a prescription medicine for pain, take it as prescribed. ? If you are not taking a prescription pain medicine, ask your doctor if you can take an over-the-counter medicine. When should you call for help? Call 911 anytime you think you may need emergency care.  For example, call if:    · Your finger is cool or pale or changes color.    Call your doctor now or seek immediate medical care if:    · Your pain gets much worse.     · You have tingling, weakness, or numbness in your finger.     · You have signs of infection, such as:  ? Increased pain, swelling, warmth, or redness. ? Red streaks leading from the area. ? Pus draining from the area. ? Swollen lymph nodes in your neck, armpits, or groin. ? A fever.    Watch closely for changes in your health, and be sure to contact your doctor if:    · Your finger is not steadily improving. Where can you learn more? Go to http://chiki-kervin.info/. Enter A592 in the search box to learn more about \"Finger Fracture: Care Instructions. \"  Current as of: September 20, 2018  Content Version: 11.9  © 5673-7229 [employer]. Care instructions adapted under license by [employer] (which disclaims liability or warranty for this information). If you have questions about a medical condition or this instruction, always ask your healthcare professional. [place] [address] any warranty or liability for your use of this information. Patient Education        Elevated Blood Pressure: Care Instructions  Your Care Instructions    Blood pressure is a measure of how hard the blood pushes against the walls of your arteries. It's normal for blood pressure to go up and down throughout the day. But if it stays up over time, you have high blood pressure. Two numbers tell you your blood pressure. The first number is the systolic pressure. It shows how hard the blood pushes when your heart is pumping. The second number is the diastolic pressure. It shows how hard the blood pushes between heartbeats, when your heart is relaxed and filling with blood. An ideal blood pressure in adults is less than 120/80 (say \"120 over 80\"). High blood pressure is 140/90 or higher. You have high blood pressure if your top number is 140 or higher or your bottom number is 90 or higher, or both.   The main test for high blood pressure is simple, fast, and painless. To diagnose high blood pressure, your doctor will test your blood pressure at different times. After testing your blood pressure, your doctor may ask you to test it again when you are home. If you are diagnosed with high blood pressure, you can work with your doctor to make a long-term plan to manage it. Follow-up care is a key part of your treatment and safety. Be sure to make and go to all appointments, and call your doctor if you are having problems. It's also a good idea to know your test results and keep a list of the medicines you take. How can you care for yourself at home? · Do not smoke. Smoking increases your risk for heart attack and stroke. If you need help quitting, talk to your doctor about stop-smoking programs and medicines. These can increase your chances of quitting for good. · Stay at a healthy weight. · Try to limit how much sodium you eat to less than 2,300 milligrams (mg) a day. Your doctor may ask you to try to eat less than 1,500 mg a day. · Be physically active. Get at least 30 minutes of exercise on most days of the week. Walking is a good choice. You also may want to do other activities, such as running, swimming, cycling, or playing tennis or team sports. · Avoid or limit alcohol. Talk to your doctor about whether you can drink any alcohol. · Eat plenty of fruits, vegetables, and low-fat dairy products. Eat less saturated and total fats. · Learn how to check your blood pressure at home. When should you call for help? Call your doctor now or seek immediate medical care if:  ? · Your blood pressure is much higher than normal (such as 180/110 or higher). ? · You think high blood pressure is causing symptoms such as:  ¨ Severe headache. ¨ Blurry vision. ? Watch closely for changes in your health, and be sure to contact your doctor if:  ? · You do not get better as expected. Where can you learn more?   Go to http://chiki-kervin.info/. Enter Q270 in the search box to learn more about \"Elevated Blood Pressure: Care Instructions. \"  Current as of: 2016  Content Version: 11.4  © 4357-0571 [employer]. Care instructions adapted under license by [employer] (which disclaims liability or warranty for this information). If you have questions about a medical condition or this instruction, always ask your healthcare professional. [place] [address] any warranty or liability for your use of this information. CeQur Activation    Thank you for requesting access to Ce[employer]ur. Please follow the instructions below to securely access and download your online medical record. CeQur allows you to send messages to your doctor, view your test results, renew your prescriptions, schedule appointments, and more. How Do I Sign Up? 1. In your internet browser, go to [email or web address]  2. Click on the First Time User? Click Here link in the Sign In box. You will be redirect to the New Member Sign Up page. 3. Enter your CeQur Access Code exactly as it appears below. You will not need to use this code after youve completed the sign-up process. If you do not sign up before the expiration date, you must request a new code. CeQur Access Code: [email or web address]  Expires: 2019  9:27 AM (This is the date your CeQur access code will )    4. Enter the last four digits of your Social Security Number (xxxx) and Date of Birth (mm/dd/yyyy) as indicated and click Submit. You will be taken to the next sign-up page. 5. Create a CeQur ID. This will be your CeQur login ID and cannot be changed, so think of one that is secure and easy to remember. 6. Create a CeQur password. You can change your password at any time. 7. Enter your Password Reset Question and Answer. This can be used at a later time if you forget your password.    8. Enter your e-mail address. You will receive e-mail notification when new information is available in [address] 9. Click Sign Up. You can now view and download portions of your medical record. 10. Click the Download Summary menu link to download a portable copy of your medical information. Additional Information    If you have questions, please visit the Frequently Asked Questions section of the GeoVantage website at [email or web address] [employer]. [email or web address] Remember, GeoVantage is NOT to be used for urgent needs. For medical emergencies, dial 911.
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
## 2019-06-12 NOTE — LETTER
[facility] EMERGENCY DEPT 
[address] [facility] [address] 
[phone] Work/School Note Date: 6/12/2019 To Whom It May concern: 
 
Sully Caldera was seen and treated today in the emergency room by the following provider(s): 
Attending Provider: Keven Patient, DO 
Nurse Practitioner: Marc Smith NP. Sully Caldera may return to work on 06/14/2019. Sincerely, Karie Terrell NP

## 2020-04-16 ENCOUNTER — HOSPITAL ENCOUNTER (EMERGENCY)
Age: 40
Discharge: HOME | End: 2020-04-16
Attending: EMERGENCY MEDICINE
Payer: SELF-PAY

## 2020-04-16 VITALS
BODY MASS INDEX: 30.53 KG/M2 | HEIGHT: 66 IN | DIASTOLIC BLOOD PRESSURE: 77 MMHG | WEIGHT: 190 LBS | TEMPERATURE: 99.8 F | HEART RATE: 83 BPM | RESPIRATION RATE: 18 BRPM | SYSTOLIC BLOOD PRESSURE: 121 MMHG | OXYGEN SATURATION: 100 %

## 2020-04-16 DIAGNOSIS — K08.89 PAIN, DENTAL: Primary | ICD-10-CM

## 2020-04-16 PROCEDURE — 99282 EMERGENCY DEPT VISIT SF MDM: CPT

## 2020-04-16 RX ORDER — IBUPROFEN 600 MG/1
600 TABLET ORAL
Qty: 20 TAB | Refills: 0 | Status: SHIPPED | OUTPATIENT
Start: 2020-04-16 | End: 2020-04-23

## 2020-04-16 RX ORDER — CLINDAMYCIN HYDROCHLORIDE 300 MG/1
300 CAPSULE ORAL 4 TIMES DAILY
Qty: 28 CAP | Refills: 0 | Status: SHIPPED | OUTPATIENT
Start: 2020-04-16 | End: 2020-04-23

## 2020-04-16 NOTE — LETTER
[facility] EMERGENCY DEPT 
[address] [facility] [address] 
[phone] Work/School Note Date: 4/16/2020 To Whom It May concern: 
 
Rose Sheriff was seen and treated today in the emergency room by the following provider(s): 
Nurse Practitioner: Chan Abebe NP. Rose Sheriff may return to work on 4/18/20. Sincerely, Verlean Spatz, RN

## 2020-04-16 NOTE — ED NOTES
Patient presents to ED with C/O right lower dental pain that started yesterday. The pt stated the pain started yesterday after she ate a cookie last night. The pt stated she drank a soda today and the pain became worse. The pt denies N/V/D. Patient is A&Ox4 and aware of plan of care. The patient is in NAD.

## 2020-04-16 NOTE — ED NOTES
I have reviewed discharge and prescription instructions with the patient. The patient verbalized understanding. Pt remains remains alert and ambulatory. No acute distress noted. Needs met.

## 2020-04-16 NOTE — ED TRIAGE NOTES
Patient arrived via triage stating that she has a tooth ache on the on the lower jaw on the right side.

## 2020-04-17 NOTE — ED PROVIDER NOTES
EMERGENCY DEPARTMENT HISTORY AND PHYSICAL EXAM    2:47 PM      Date: 4/16/2020  Patient Name: Justin Coyle    History of Presenting Illness     Chief Complaint   Patient presents with    Dental Pain    Fever       History Provided By: Patient  Chief complaint: Right lower molar dental pain without facial swelling, erythema, warmth to touch. Dental pain ongoing on and off over a few months, worsening in the last 2 days. No difficulty swallowing. No shortness of breath. No trouble breathing. No nausea, vomiting, diarrhea, constipation. No pelvic or abdominal pain. No chest pain or chest discomfort. No sick contacts. No recent travel. Additional History (Context): Justin Coyle is a 45 y.o. female with past medical history significant for GERD who presented to the ED as noted above. PCP: None    Current Outpatient Medications   Medication Sig Dispense Refill    clindamycin (CLEOCIN) 300 mg capsule Take 1 Cap by mouth four (4) times daily for 7 days. 28 Cap 0    ibuprofen (MOTRIN) 600 mg tablet Take 1 Tab by mouth every six (6) hours as needed for Pain for up to 7 days. 20 Tab 0       Past History     Past Medical History:  Past Medical History:   Diagnosis Date    Anemia     Chronic knee pain     GERD (gastroesophageal reflux disease)        Past Surgical History:  History reviewed. No pertinent surgical history. Family History:  History reviewed. No pertinent family history. Social History:  Social History     Tobacco Use    Smoking status: Never Smoker    Smokeless tobacco: Never Used   Substance Use Topics    Alcohol use: Yes     Comment: socially, occasionally    Drug use: No       Allergies:  No Known Allergies      Review of Systems       Review of Systems   Constitutional: Negative for chills, diaphoresis, fatigue and fever. HENT: Positive for dental problem. Negative for congestion, drooling, ear pain, facial swelling and sore throat. Eyes: Negative for pain. Respiratory: Negative for cough, chest tightness, shortness of breath and wheezing. Cardiovascular: Negative for chest pain, palpitations and leg swelling. Gastrointestinal: Negative for abdominal pain, constipation, diarrhea, nausea and vomiting. Genitourinary: Negative for dysuria, flank pain, hematuria, pelvic pain, vaginal bleeding and vaginal discharge. Musculoskeletal: Negative for back pain, joint swelling, neck pain and neck stiffness. Neurological: Negative for dizziness, weakness, light-headedness and headaches. Physical Exam     Visit Vitals  /77 (BP 1 Location: Right arm, BP Patient Position: At rest;Sitting)   Pulse 83   Temp 99.8 °F (37.7 °C)   Resp 18   Ht 5' 6\" (1.676 m)   Wt 86.2 kg (190 lb)   LMP 03/24/2020 (Exact Date)   SpO2 100%   BMI 30.67 kg/m²         Physical Exam  Vitals signs and nursing note reviewed. Constitutional:       General: She is not in acute distress. Appearance: Normal appearance. She is not ill-appearing, toxic-appearing or diaphoretic. HENT:      Head: Normocephalic and atraumatic. Right Ear: External ear normal.      Left Ear: External ear normal.      Mouth/Throat:      Mouth: Mucous membranes are moist. No lacerations or oral lesions. Dentition: Abnormal dentition. Dental tenderness, gingival swelling and dental caries present. No dental abscesses. Tongue: No lesions. Tongue does not deviate from midline. Palate: No mass and lesions. Pharynx: Uvula midline. No pharyngeal swelling, oropharyngeal exudate, posterior oropharyngeal erythema or uvula swelling. Tonsils: No tonsillar exudate. Eyes:      Conjunctiva/sclera: Conjunctivae normal.   Neck:      Musculoskeletal: Normal range of motion and neck supple. No neck rigidity or muscular tenderness. Cardiovascular:      Rate and Rhythm: Normal rate and regular rhythm. Pulses: Normal pulses. Heart sounds: Normal heart sounds. No murmur.    Pulmonary: Effort: Pulmonary effort is normal. No respiratory distress. Breath sounds: Normal breath sounds. No wheezing. Abdominal:      General: Bowel sounds are normal. There is no distension. Palpations: Abdomen is soft. Tenderness: There is no abdominal tenderness. There is no right CVA tenderness or left CVA tenderness. Musculoskeletal: Normal range of motion. Skin:     General: Skin is warm and dry. Neurological:      General: No focal deficit present. Mental Status: She is alert and oriented to person, place, and time. Psychiatric:         Behavior: Behavior normal.           Diagnostic Study Results     Labs -  No results found for this or any previous visit (from the past 12 hour(s)). Radiologic Studies -   No orders to display         Medical Decision Making   I am the first provider for this patient. I reviewed the vital signs, available nursing notes, past medical history, past surgical history, family history and social history. Vital Signs-Reviewed the patient's vital signs. Records Reviewed: Nursing Notes and Old Medical Records (Time of Review: 2:47 PM)    ED Course: Progress Notes, Reevaluation, and Consults:      Provider Notes (Medical Decision Making): This is a 52-year-old female with past medical history significant for GERD who presented to the ED with chief complaint of dental pain ongoing on and off over several months, worsening in the last 2 days. Ddx: dental carries, periodontal cellulitis, periodontal abscess, dental trauma, acute pharyngitis, acute sinusitis,, periodontal abscess, otitis media / externa, temporal arteritis. She is in no acute distress. Afebrile. Oropharynx without erythema or exudate. No tonsillar exudate or swelling. Uvula at midline. Multiple dental caries to both upper and lower molars noted with dental tenderness and gingival swelling. No overt evidence of abscess. No no facial swelling.   No lymphadenopathy. Extensive review of return precautions discussed with patient prior to discharge. Discussed importance of PCP follow-up/reassessment and need to return to ED immediately should symptoms worsen. Patient verbalizes understanding of these instructions and is in agreement with discharge plan. Diagnosis     Clinical Impression:   1. Pain, dental        Disposition: home       Follow-up Information     Follow up With Specialties Details Why [address] [facility] EMERGENCY DEPT Emergency Medicine  If symptoms worsen [address]  [address]  In 2 days dental pain  [address] [id number]  [phone]           Discharge Medication List as of 4/16/2020  5:03 PM      START taking these medications    Details   clindamycin (CLEOCIN) 300 mg capsule Take 1 Cap by mouth four (4) times daily for 7 days. , Normal, Disp-28 Cap, R-0      ibuprofen (MOTRIN) 600 mg tablet Take 1 Tab by mouth every six (6) hours as needed for Pain for up to 7 days. , Normal, Disp-20 Tab, R-0             Dictation disclaimer:  Please note that this dictation was completed with Lizhi, the [employer] voice recognition software. Quite often unanticipated grammatical, syntax, homophones, and other interpretive errors are inadvertently transcribed by the computer software. Please disregard these errors. Please excuse any errors that have escaped final proofreading.

## 2020-04-27 ENCOUNTER — HOSPITAL ENCOUNTER (OUTPATIENT)
Dept: GENERAL RADIOLOGY | Age: 40
Discharge: HOME | End: 2020-04-27
Payer: SELF-PAY

## 2020-04-27 ENCOUNTER — HOSPITAL ENCOUNTER (EMERGENCY)
Age: 40
Discharge: HOME | End: 2020-04-27
Attending: EMERGENCY MEDICINE
Payer: SELF-PAY

## 2020-04-27 VITALS
WEIGHT: 218.9 LBS | SYSTOLIC BLOOD PRESSURE: 123 MMHG | RESPIRATION RATE: 20 BRPM | DIASTOLIC BLOOD PRESSURE: 81 MMHG | BODY MASS INDEX: 35.18 KG/M2 | OXYGEN SATURATION: 100 % | HEART RATE: 75 BPM | HEIGHT: 66 IN | TEMPERATURE: 97.5 F

## 2020-04-27 DIAGNOSIS — R51.9 NONINTRACTABLE HEADACHE, UNSPECIFIED CHRONICITY PATTERN, UNSPECIFIED HEADACHE TYPE: Primary | ICD-10-CM

## 2020-04-27 DIAGNOSIS — D64.9 ANEMIA, UNSPECIFIED TYPE: ICD-10-CM

## 2020-04-27 DIAGNOSIS — M54.50 LOWER BACK PAIN: ICD-10-CM

## 2020-04-27 LAB
ALBUMIN SERPL-MCNC: 3.5 G/DL (ref 3.4–5)
ALBUMIN/GLOB SERPL: 0.8 {RATIO} (ref 0.8–1.7)
ALP SERPL-CCNC: 55 U/L (ref 45–117)
ALT SERPL-CCNC: 15 U/L (ref 13–56)
ANION GAP SERPL CALC-SCNC: 6 MMOL/L (ref 3–18)
AST SERPL-CCNC: 14 U/L (ref 10–38)
BASOPHILS # BLD: 0 K/UL (ref 0–0.1)
BASOPHILS NFR BLD: 0 % (ref 0–2)
BILIRUB SERPL-MCNC: 0.2 MG/DL (ref 0.2–1)
BUN SERPL-MCNC: 9 MG/DL (ref 7–18)
BUN/CREAT SERPL: 13 (ref 12–20)
CALCIUM SERPL-MCNC: 8.5 MG/DL (ref 8.5–10.1)
CHLORIDE SERPL-SCNC: 110 MMOL/L (ref 100–111)
CO2 SERPL-SCNC: 27 MMOL/L (ref 21–32)
CREAT SERPL-MCNC: 0.7 MG/DL (ref 0.6–1.3)
DIFFERENTIAL METHOD BLD: ABNORMAL
EOSINOPHIL # BLD: 0 K/UL (ref 0–0.4)
EOSINOPHIL NFR BLD: 0 % (ref 0–5)
ERYTHROCYTE [DISTWIDTH] IN BLOOD BY AUTOMATED COUNT: 19.7 % (ref 11.6–14.5)
GLOBULIN SER CALC-MCNC: 4.4 G/DL (ref 2–4)
GLUCOSE SERPL-MCNC: 111 MG/DL (ref 74–99)
HCG SERPL QL: NEGATIVE
HCT VFR BLD AUTO: 25.4 % (ref 35–45)
HGB BLD-MCNC: 8 G/DL (ref 12–16)
LYMPHOCYTES # BLD: 1.8 K/UL (ref 0.9–3.6)
LYMPHOCYTES NFR BLD: 23 % (ref 21–52)
MCH RBC QN AUTO: 18.9 PG (ref 24–34)
MCHC RBC AUTO-ENTMCNC: 31.5 G/DL (ref 31–37)
MCV RBC AUTO: 59.9 FL (ref 74–97)
MONOCYTES # BLD: 0.5 K/UL (ref 0.05–1.2)
MONOCYTES NFR BLD: 6 % (ref 3–10)
NEUTS SEG # BLD: 5.4 K/UL (ref 1.8–8)
NEUTS SEG NFR BLD: 71 % (ref 40–73)
PLATELET # BLD AUTO: 368 K/UL (ref 135–420)
PLATELET COMMENTS,PCOM: ABNORMAL
PMV BLD AUTO: 9.4 FL (ref 9.2–11.8)
POTASSIUM SERPL-SCNC: 3.8 MMOL/L (ref 3.5–5.5)
PROT SERPL-MCNC: 7.9 G/DL (ref 6.4–8.2)
RBC # BLD AUTO: 4.24 M/UL (ref 4.2–5.3)
RBC MORPH BLD: ABNORMAL
SODIUM SERPL-SCNC: 143 MMOL/L (ref 136–145)
WBC # BLD AUTO: 7.7 K/UL (ref 4.6–13.2)

## 2020-04-27 PROCEDURE — 72100 X-RAY EXAM L-S SPINE 2/3 VWS: CPT

## 2020-04-27 PROCEDURE — 85025 COMPLETE CBC W/AUTO DIFF WBC: CPT

## 2020-04-27 PROCEDURE — 99282 EMERGENCY DEPT VISIT SF MDM: CPT

## 2020-04-27 PROCEDURE — 84703 CHORIONIC GONADOTROPIN ASSAY: CPT

## 2020-04-27 PROCEDURE — 80053 COMPREHEN METABOLIC PANEL: CPT

## 2020-04-27 PROCEDURE — 93005 ELECTROCARDIOGRAM TRACING: CPT

## 2020-04-27 RX ORDER — LANOLIN ALCOHOL/MO/W.PET/CERES
325 CREAM (GRAM) TOPICAL 2 TIMES DAILY
Qty: 60 TAB | Refills: 1 | Status: SHIPPED | OUTPATIENT
Start: 2020-04-27

## 2020-04-27 NOTE — LETTER
NOTIFICATION RETURN TO WORK / SCHOOL 
 
4/27/2020 5:08 PM 
 
Ms. Lakesha Meehan [address] [facility] [address] [facility] [address] To Whom It May Concern: 
 
Lakesha Mehean is [facility] the [facility]. She will return to work/school on: 4/29/2020 If there are questions or concerns please have the patient contact our office.  
 
 
 
Sincerely, 
 
 
SANGEETA Rebolledo

## 2020-04-27 NOTE — ED PROVIDER NOTES
EMERGENCY DEPARTMENT HISTORY AND PHYSICAL EXAM    3:41 PM      Date: 4/27/2020  Patient Name: Rosan Boast    History of Presenting Illness     Chief Complaint   Patient presents with    Dizziness    Headache    Numbness     Chris foot         History Provided By: Patient    Additional History (Context): Rosan Boast is a 45 y.o. female with past medical history significant for GERD, anemia, and chronic knee pain who presents with complaints of diffuse headache associated with nausea and one episode of vomiting for the last week. She occasionally has dizziness when she gets up too quickly but denies any syncope. She also reports bilateral foot numbness mostly in the toes that she has had ongoing for a while and her PCP is working out- she was recently told she was pre-DM. She denies any head trauma, injury, or fall. She denies any visual changes, photophobia, phonophobia, syncope. Taking ibuprofen and naproxen which does provide relief. She actually feels like her headache is resolved now. Known history of anemia due to heavy periods- but not currently taking any iron supplements. PCP: None    Current Facility-Administered Medications   Medication Dose Route Frequency Provider Last Rate Last Dose    sodium chloride 0.9 % bolus infusion 1,000 mL  1,000 mL IntraVENous ONCE Cierra [place] FNP         Current Outpatient Medications   Medication Sig Dispense Refill    ferrous sulfate 325 mg (65 mg iron) tablet Take 1 Tab by mouth two (2) times a day. 61 Tab 1       Past History     Past Medical History:  Past Medical History:   Diagnosis Date    Anemia     Chronic knee pain     GERD (gastroesophageal reflux disease)        Past Surgical History:  No past surgical history on file. Family History:  No family history on file. Social History:  Social History     Tobacco Use    Smoking status: Never Smoker    Smokeless tobacco: Never Used   Substance Use Topics    Alcohol use:  Yes Comment: socially, occasionally    Drug use: No       Allergies:  No Known Allergies      Review of Systems       Review of Systems   Constitutional: Negative. Negative for chills and fever. HENT: Negative. Negative for congestion, ear pain and rhinorrhea. Eyes: Negative. Negative for photophobia, pain, discharge, redness, itching and visual disturbance. Respiratory: Negative. Negative for cough and shortness of breath. Cardiovascular: Negative. Negative for chest pain, palpitations and leg swelling. Gastrointestinal: Negative. Negative for abdominal pain, constipation, diarrhea, nausea and vomiting. Genitourinary: Negative. Negative for dysuria, frequency, hematuria and urgency. Musculoskeletal: Negative. Negative for back pain, gait problem, joint swelling and neck pain. Skin: Negative. Negative for rash and wound. Neurological: Positive for dizziness, numbness and headaches. Negative for tremors, seizures, syncope, facial asymmetry, speech difficulty, weakness and light-headedness. Hematological: Negative for adenopathy. Does not bruise/bleed easily. All other systems reviewed and are negative. Physical Exam     Visit Vitals  /81 (BP 1 Location: Left arm, BP Patient Position: At rest;Supine)   Pulse 75   Temp 97.5 °F (36.4 °C)   Resp 20   Ht 5' 6\" (1.676 m)   Wt 99.3 kg (218 lb 14.4 oz)   SpO2 100%   BMI 35.33 kg/m²         Physical Exam  Vitals signs and nursing note reviewed. Constitutional:       General: She is not in acute distress. Appearance: Normal appearance. She is not ill-appearing, toxic-appearing or diaphoretic. HENT:      Head: Normocephalic and atraumatic. Right Ear: Tympanic membrane, ear canal and external ear normal. There is no impacted cerumen. Left Ear: Tympanic membrane, ear canal and external ear normal. There is no impacted cerumen. Nose: Nose normal. No congestion or rhinorrhea.       Mouth/Throat:      Mouth: Mucous membranes are moist.      Pharynx: Oropharynx is clear. No oropharyngeal exudate or posterior oropharyngeal erythema. Eyes:      General: Lids are normal. Vision grossly intact. No visual field deficit or scleral icterus. Right eye: No discharge. Left eye: No discharge. Conjunctiva/sclera: Conjunctivae normal.      Pupils: Pupils are equal, round, and reactive to light. Neck:      Musculoskeletal: Full passive range of motion without pain, normal range of motion and neck supple. Cardiovascular:      Rate and Rhythm: Normal rate and regular rhythm. Pulses: Normal pulses. Heart sounds: Normal heart sounds. No murmur. No friction rub. No gallop. Pulmonary:      Effort: Pulmonary effort is normal. No respiratory distress. Breath sounds: Normal breath sounds. No stridor. No wheezing, rhonchi or rales. Chest:      Chest wall: No tenderness. Abdominal:      General: Bowel sounds are normal.      Palpations: Abdomen is soft. Tenderness: There is no abdominal tenderness. Musculoskeletal: Normal range of motion. Lymphadenopathy:      Cervical: No cervical adenopathy. Skin:     General: Skin is warm and dry. Capillary Refill: Capillary refill takes less than 2 seconds. Neurological:      General: No focal deficit present. Mental Status: She is alert and oriented to person, place, and time. Cranial Nerves: No dysarthria. Sensory: No sensory deficit. Motor: No weakness, tremor, abnormal muscle tone or pronator drift. Coordination: Romberg sign negative. Coordination normal. Finger-Nose-Finger Test and Heel to [facility] Test normal. Rapid alternating movements normal.      Gait: Gait normal.   Psychiatric:         Mood and Affect: Mood normal.         Behavior: Behavior normal. Behavior is cooperative.            Diagnostic Study Results     Labs -  Recent Results (from the past 12 hour(s))   EKG, 12 LEAD, INITIAL    Collection Time: 04/27/20 3:04 PM   Result Value Ref Range    Ventricular Rate 65 BPM    Atrial Rate 65 BPM    P-R Interval 188 ms    QRS Duration 78 ms    Q-T Interval 404 ms    QTC Calculation (Bezet) 420 ms    Calculated P Axis 62 degrees    Calculated R Axis 31 degrees    Calculated T Axis 12 degrees    Diagnosis       Normal sinus rhythm  Nonspecific T wave abnormality  Abnormal ECG  When compared with ECG of 16-DEC-2018 20:34,  No significant change was found     CBC WITH AUTOMATED DIFF    Collection Time: 04/27/20  3:53 PM   Result Value Ref Range    WBC 7.7 4.6 - 13.2 K/uL    RBC 4.24 4.20 - 5.30 M/uL    HGB 8.0 (L) 12.0 - 16.0 g/dL    HCT 25.4 (L) 35.0 - 45.0 %    MCV 59.9 (L) 74.0 - 97.0 FL    MCH 18.9 (L) 24.0 - 34.0 PG    MCHC 31.5 31.0 - 37.0 g/dL    RDW 19.7 (H) 11.6 - 14.5 %    PLATELET 115 583 - 734 K/uL    MPV 9.4 9.2 - 11.8 FL    NEUTROPHILS 71 40 - 73 %    LYMPHOCYTES 23 21 - 52 %    MONOCYTES 6 3 - 10 %    EOSINOPHILS 0 0 - 5 %    BASOPHILS 0 0 - 2 %    ABS. NEUTROPHILS 5.4 1.8 - 8.0 K/UL    ABS. LYMPHOCYTES 1.8 0.9 - 3.6 K/UL    ABS. MONOCYTES 0.5 0.05 - 1.2 K/UL    ABS. EOSINOPHILS 0.0 0.0 - 0.4 K/UL    ABS.  BASOPHILS 0.0 0.0 - 0.1 K/UL    DF AUTOMATED      PLATELET COMMENTS ADEQUATE PLATELETS      RBC COMMENTS ANISOCYTOSIS  1+        RBC COMMENTS TARGET CELLS  2+        RBC COMMENTS HYPOCHROMIA  1+        RBC COMMENTS OVALOCYTES  PRESENT        RBC COMMENTS SCHISTOCYTES  1+       METABOLIC PANEL, COMPREHENSIVE    Collection Time: 04/27/20  3:53 PM   Result Value Ref Range    Sodium 143 136 - 145 mmol/L    Potassium 3.8 3.5 - 5.5 mmol/L    Chloride 110 100 - 111 mmol/L    CO2 27 21 - 32 mmol/L    Anion gap 6 3.0 - 18 mmol/L    Glucose 111 (H) 74 - 99 mg/dL    BUN 9 7.0 - 18 MG/DL    Creatinine 0.70 0.6 - 1.3 MG/DL    BUN/Creatinine ratio 13 12 - 20      GFR est AA >60 >60 ml/min/1.73m2    GFR est non-AA >60 >60 ml/min/1.73m2    Calcium 8.5 8.5 - 10.1 MG/DL    Bilirubin, total 0.2 0.2 - 1.0 MG/DL    ALT (SGPT) 15 13 - 56 U/L    AST (SGOT) 14 10 - 38 U/L    Alk. phosphatase 55 45 - 117 U/L    Protein, total 7.9 6.4 - 8.2 g/dL    Albumin 3.5 3.4 - 5.0 g/dL    Globulin 4.4 (H) 2.0 - 4.0 g/dL    A-G Ratio 0.8 0.8 - 1.7     HCG QL SERUM    Collection Time: 04/27/20  3:53 PM   Result Value Ref Range    HCG, Ql. Negative NEG         Radiologic Studies -   No orders to display         Medical Decision Making   I am the first provider for this patient. I reviewed available nursing notes, past medical history, past surgical history, family history and social history. Vital Signs-Reviewed the patient's vital signs. Records Reviewed: Nursing Notes and Old Medical Records (Time of Review: 3:41 PM)    Pulse Oximetry Analysis - 100% on room air      ED Course: Progress Notes, Reevaluation, and Consults:  3:41 PM  Initial assessment performed. The patients presenting problems have been discussed, and they/their family are in agreement with the care plan formulated and outlined with them. I have encouraged them to ask questions as they arise throughout their visit. Provider Notes (Medical Decision Making):     23-year-old female presents to the ER with complaints of headache, dizziness, and nausea. Actually states that her headache is resolved on my exam.  She states she has been working 2 jobs and has been 2 jobs over the last few weeks and has not been sleeping or eating well. Will obtain appropriate studies to evaluate patient's complaints and treat symptomatically. Will disposition after reassessment assuming no clinical change or worsening and appropriate response to symptomatic treatment. This patient presents with gradual onset of headache. There are no headache red flags. No fevers, no neck stiffness, no trauma, no focal neurologic findings, no loss of vision. The headache was not maximal at onset and was not a thunderclap headache.  Based upon their history and physical examination, at this time they do not appear to have a subarachnoid hemorrhage, mass lesion, meningitis or encephalitis. As well there appears to be no evidence of temporal arteritis or psuedotumor cerebri. The patient has improved with the therapeutic interventions administered here in the ED and have remained stable with no acute neurological deficits. Labs show anemia with Hgb 8, Hct 25.4 which is consistent with patient history. She is currently asymptomatic. On reexamination, patient is feeling much improved and requesting to go home. Patient remained clinically stable throughout the emergency room visit. We feel the patient is stable for outpatient management with appropriate symptomatic treatment, return precautions, and was advised of the importance of outpatient follow up for ongoing care. They feel comfortable going home, and I feel they are safe to be discharged. I have instructed them to return to the ED immediately should they become worse in any way including worsening pain, fevers, neck stiffness, balance problems, extremity weakness/numbness, or confusion. I have discussed with patient and/or family/sig other the results, interpretation of any imaging if performed, suspected diagnosis and treatment plan to include instructions regarding the diagnoses listed to which understanding was expressed with all questions answered        Diagnosis     Clinical Impression:   1. Nonintractable headache, unspecified chronicity pattern, unspecified headache type    2. Anemia, unspecified type        Disposition: Discharged home in stable condition     DISCHARGE NOTE:     Patient has been reexamined. Patient has no new complaints, changes, or physical findings. Care plan outlined and precautions discussed. Results of labs were reviewed with the patient All medications were reviewed with the patient; will discharge home with iron supplementation. All of patient's questions and concerns were addressed.  Patient was instructed and agrees to follow up with hematology, as well as to return to the ED upon further deterioration. Patient is ready to go home. Follow-up Information     Follow up With Specialties Details Why [address] [facility]    [facility] EMERGENCY DEPT Emergency Medicine  As needed, If symptoms worsen [address] [facility] [address]  [address] Schedule an appointment as soon as possible for a visit Follow-up from the Emergency Department [address] [facility] [id number]  Jim Brizuela MD Oncology Schedule an appointment as soon as possible for a visit Follow-up from the Emergency Department for the anemia. [facility] [address]  [address] [facility] [address]             Current Discharge Medication List      START taking these medications    Details   ferrous sulfate 325 mg (65 mg iron) tablet Take 1 Tab by mouth two (2) times a day. Qty: 60 Tab, Refills: 1               Dictation disclaimer:  Please note that this dictation was completed with [employer], the computer voice recognition software. Quite often unanticipated grammatical, syntax, homophones, and other interpretive errors are inadvertently transcribed by the computer software. Please disregard these errors. Please excuse any errors that have escaped final proofreading.

## 2020-04-27 NOTE — DISCHARGE INSTRUCTIONS
Headache: Care Instructions  Your Care Instructions    Headaches have many possible causes. Most headaches aren't a sign of a more serious problem, and they will get better on their own. Home treatment may help you feel better faster. The doctor has checked you carefully, but problems can develop later. If you notice any problems or new symptoms, get medical treatment right away. Follow-up care is a key part of your treatment and safety. Be sure to make and go to all appointments, and call your doctor if you are having problems. It's also a good idea to know your test results and keep a list of the medicines you take. How can you care for yourself at home? · Do not drive if you have taken a prescription pain medicine. · Rest in a quiet, dark room until your headache is gone. Close your eyes and try to relax or go to sleep. Don't watch TV or read. · Put a cold, moist cloth or cold pack on the painful area for 10 to 20 minutes at a time. Put a thin cloth between the cold pack and your skin. · Use a warm, moist towel or a heating pad set on low to relax tight shoulder and neck muscles. · Have someone gently massage your neck and shoulders. · Take pain medicines exactly as directed. ? If the doctor gave you a prescription medicine for pain, take it as prescribed. ? If you are not taking a prescription pain medicine, ask your doctor if you can take an over-the-counter medicine. · Be careful not to take pain medicine more often than the instructions allow, because you may get worse or more frequent headaches when the medicine wears off. · Do not ignore new symptoms that occur with a headache, such as a fever, weakness or numbness, vision changes, or confusion. These may be signs of a more serious problem. To prevent headaches  · Keep a headache diary so you can figure out what triggers your headaches. Avoiding triggers may help you prevent headaches.  Record when each headache began, how long it lasted, and what the pain was like (throbbing, aching, stabbing, or dull). Write down any other symptoms you had with the headache, such as nausea, flashing lights or dark spots, or sensitivity to bright light or loud noise. Note if the headache occurred near your period. List anything that might have triggered the headache, such as certain foods (chocolate, cheese, wine) or odors, smoke, bright light, stress, or lack of sleep. · Find healthy ways to deal with stress. Headaches are most common during or right after stressful times. Take time to relax before and after you do something that has caused a headache in the past.  · Try to keep your muscles relaxed by keeping good posture. Check your jaw, face, neck, and shoulder muscles for tension, and try relaxing them. When sitting at a desk, change positions often, and stretch for 30 seconds each hour. · Get plenty of sleep and exercise. · Eat regularly and well. Long periods without food can trigger a headache. · Treat yourself to a massage. Some people find that regular massages are very helpful in relieving tension. · Limit caffeine by not drinking too much coffee, tea, or soda. But don't quit caffeine suddenly, because that can also give you headaches. · Reduce eyestrain from computers by blinking frequently and looking away from the computer screen every so often. Make sure you have proper eyewear and that your monitor is set up properly, about an arm's length away. · Seek help if you have depression or anxiety. Your headaches may be linked to these conditions. Treatment can both prevent headaches and help with symptoms of anxiety or depression. When should you call for help? Call 911 anytime you think you may need emergency care. For example, call if:    · You have signs of a stroke. These may include:  ? Sudden numbness, paralysis, or weakness in your face, arm, or leg, especially on only one side of your body. ? Sudden vision changes.   ? Sudden trouble speaking. ? Sudden confusion or trouble understanding simple statements. ? Sudden problems with walking or balance. ? A sudden, severe headache that is different from past headaches.    Call your doctor now or seek immediate medical care if:    · You have a new or worse headache.     · Your headache gets much worse. Where can you learn more? Go to http://chiki-kervin.info/  Enter M271 in the search box to learn more about \"Headache: Care Instructions. \"  Current as of: November 19, 2019Content Version: 12.4  © 3359-0689 [employer] Care instructions adapted under license by [employer] (which disclaims liability or warranty for this information). If you have questions about a medical condition or this instruction, always ask your healthcare professional. [address] any warranty or liability for your use of this information. Patient Education        Anemia From Heavy Bleeding: Care Instructions  Your Care Instructions    Anemia means that your body does not have enough red blood cells. Red blood cells carry oxygen around the body. When you have anemia, you may feel dizzy, tired, and weak. You may also feel your heart pounding. For some people, it's hard to focus and think clearly. One common cause of anemia is bleeding. Bleeding from ulcers, hemorrhoids, cancer, or other problems can cause anemia. It may also be caused by heavy menstrual periods. Your treatment may include iron pills. Iron helps your body make hemoglobin. Hemoglobin is the part of the red blood cell that carries oxygen. If you have severe anemia, you may need a blood transfusion to give you red blood cells as quickly as possible. Sometimes it takes several months to get iron levels back to normal.  Follow-up care is a key part of your treatment and safety. Be sure to make and go to all appointments, and call your doctor if you are having problems.  It's also a good idea to know your test results and keep a list of the medicines you take. How can you care for yourself at home? · Be safe with medicines. Take your medicines exactly as prescribed. Call your doctor if you think you are having a problem with your medicine. · Follow your doctor's advice about eating foods that have a lot of iron in them. These include red meat, shellfish, poultry, and eggs. They also include beans, raisins, whole-grain bread, and leafy green vegetables. · Steam your vegetables. This is the best way to prepare them if you want to get as much iron as possible. · Iron pills can cause constipation. If you take them, there are things you can do to avoid constipation. Drink plenty of fluids, eat foods with a lot of fiber, and exercise every day. When should you call for help? Call 911 anytime you think you may need emergency care. For example, call if:    · You passed out (lost consciousness).     · Your stools are maroon or very bloody.    Call your doctor now or seek immediate medical care if:    · You are short of breath.     · You have new or worse bleeding.     · You are dizzy or light-headed, or you feel like you may faint.    Watch closely for changes in your health, and be sure to contact your doctor if:    · You feel weaker or more tired than usual.     · You do not get better as expected. Where can you learn more? Go to http://chiki-kervin.info/  Enter I435 in the search box to learn more about \"Anemia From Heavy Bleeding: Care Instructions. \"  Current as of: November 7, 2019Content Version: 12.4  © 2514-1404 [employer]. Care instructions adapted under license by [employer] (which disclaims liability or warranty for this information). If you have questions about a medical condition or this instruction, always ask your healthcare professional. [address] any warranty or liability for your use of this information.

## 2020-04-28 LAB
ATRIAL RATE: 65 BPM
CALCULATED P AXIS, ECG09: 62 DEGREES
CALCULATED R AXIS, ECG10: 31 DEGREES
CALCULATED T AXIS, ECG11: 12 DEGREES
DIAGNOSIS, 93000: NORMAL
P-R INTERVAL, ECG05: 188 MS
Q-T INTERVAL, ECG07: 404 MS
QRS DURATION, ECG06: 78 MS
QTC CALCULATION (BEZET), ECG08: 420 MS
VENTRICULAR RATE, ECG03: 65 BPM

## 2020-04-29 ENCOUNTER — HOSPITAL ENCOUNTER (EMERGENCY)
Age: 40
Discharge: HOME | End: 2020-04-29
Attending: EMERGENCY MEDICINE
Payer: SELF-PAY

## 2020-04-29 VITALS
WEIGHT: 216.5 LBS | HEIGHT: 66 IN | BODY MASS INDEX: 34.79 KG/M2 | DIASTOLIC BLOOD PRESSURE: 87 MMHG | HEART RATE: 88 BPM | TEMPERATURE: 98.9 F | SYSTOLIC BLOOD PRESSURE: 144 MMHG | OXYGEN SATURATION: 98 % | RESPIRATION RATE: 16 BRPM

## 2020-04-29 DIAGNOSIS — D64.9 SYMPTOMATIC ANEMIA: ICD-10-CM

## 2020-04-29 DIAGNOSIS — R51.9 NONINTRACTABLE HEADACHE, UNSPECIFIED CHRONICITY PATTERN, UNSPECIFIED HEADACHE TYPE: Primary | ICD-10-CM

## 2020-04-29 PROCEDURE — 99282 EMERGENCY DEPT VISIT SF MDM: CPT

## 2020-04-29 PROCEDURE — 74011250636 HC RX REV CODE- 250/636: Performed by: EMERGENCY MEDICINE

## 2020-04-29 PROCEDURE — 96372 THER/PROPH/DIAG INJ SC/IM: CPT

## 2020-04-29 RX ORDER — BUTALBITAL, ACETAMINOPHEN AND CAFFEINE 300; 40; 50 MG/1; MG/1; MG/1
1 CAPSULE ORAL
Qty: 6 CAP | Refills: 0 | Status: SHIPPED | OUTPATIENT
Start: 2020-04-29

## 2020-04-29 RX ORDER — METOCLOPRAMIDE HYDROCHLORIDE 5 MG/ML
10 INJECTION INTRAMUSCULAR; INTRAVENOUS
Status: COMPLETED | OUTPATIENT
Start: 2020-04-29 | End: 2020-04-29

## 2020-04-29 RX ORDER — ONDANSETRON 4 MG/1
8 TABLET, FILM COATED ORAL
Qty: 12 TAB | Refills: 0 | Status: SHIPPED | OUTPATIENT
Start: 2020-04-29

## 2020-04-29 RX ORDER — ONDANSETRON 4 MG/1
8 TABLET, FILM COATED ORAL
Qty: 12 TAB | Refills: 0 | Status: SHIPPED | OUTPATIENT
Start: 2020-04-29 | End: 2020-04-29

## 2020-04-29 RX ORDER — BUTALBITAL, ACETAMINOPHEN AND CAFFEINE 300; 40; 50 MG/1; MG/1; MG/1
1 CAPSULE ORAL
Qty: 6 CAP | Refills: 0 | Status: SHIPPED | OUTPATIENT
Start: 2020-04-29 | End: 2020-04-29

## 2020-04-29 RX ORDER — DIPHENHYDRAMINE HYDROCHLORIDE 50 MG/ML
50 INJECTION, SOLUTION INTRAMUSCULAR; INTRAVENOUS ONCE
Status: COMPLETED | OUTPATIENT
Start: 2020-04-29 | End: 2020-04-29

## 2020-04-29 RX ADMIN — METOCLOPRAMIDE 10 MG: 5 INJECTION, SOLUTION INTRAMUSCULAR; INTRAVENOUS at 16:47

## 2020-04-29 RX ADMIN — DIPHENHYDRAMINE HYDROCHLORIDE 50 MG: 50 INJECTION INTRAMUSCULAR; INTRAVENOUS at 16:48

## 2020-04-29 NOTE — ED TRIAGE NOTES
Patient arrived via triage stating \"I came in two days ago and now I feel worse. \" Patient states that she has a headache, throwing up, dizziness, and body aches.

## 2020-04-29 NOTE — DISCHARGE INSTRUCTIONS
Patient Education        Anemia: Care Instructions  Your Care Instructions    Anemia is a low level of red blood cells, which carry oxygen throughout your body. Many things can cause anemia. Lack of iron is one of the most common causes. Your body needs iron to make hemoglobin, a substance in red blood cells that carries oxygen from the lungs to your body's cells. Without enough iron, the body produces fewer and smaller red blood cells. As a result, your body's cells do not get enough oxygen, and you feel tired and weak. And you may have trouble concentrating. Bleeding is the most common cause of a lack of iron. You may have heavy menstrual bleeding or bleeding caused by conditions such as ulcers, hemorrhoids, or cancer. Regular use of aspirin or other anti-inflammatory medicines (such as ibuprofen) also can cause bleeding in some people. A lack of iron in your diet also can cause anemia, especially at times when the body needs more iron, such as during pregnancy, infancy, and the teen years. Your doctor may have prescribed iron pills. It may take several months of treatment for your iron levels to return to normal. Your doctor also may suggest that you eat foods that are rich in iron, such as meat and beans. There are many other causes of anemia. It is not always due to a lack of iron. Finding the specific cause of your anemia will help your doctor find the right treatment for you. Follow-up care is a key part of your treatment and safety. Be sure to make and go to all appointments, and call your doctor if you are having problems. It's also a good idea to know your test results and keep a list of the medicines you take. How can you care for yourself at home? · Take your medicines exactly as prescribed. Call your doctor if you think you are having a problem with your medicine.   · If your doctor recommends iron pills, take them as directed:  ? Try to take the pills on an empty stomach about 1 hour before or 2 hours after meals. But you may need to take iron with food to avoid an upset stomach. ? Do not take antacids or drink milk or caffeine drinks (such as coffee, tea, or cola) at the same time or within 2 hours of the time that you take your iron. They can make it hard for your body to absorb the iron. ? Vitamin C (from food or supplements) helps your body absorb iron. Try taking iron pills with a glass of orange juice or some other food that is high in vitamin C, such as citrus fruits. ? Iron pills may cause stomach problems, such as heartburn, nausea, diarrhea, constipation, and cramps. Be sure to drink plenty of fluids, and include fruits, vegetables, and fiber in your diet each day. Iron pills often make your bowel movements dark or green. ? If you forget to take an iron pill, do not take a double dose of iron the next time you take a pill. ? Keep iron pills out of the reach of small children. An overdose of iron can be very dangerous. · Follow your doctor's advice about eating iron-rich foods. These include red meat, shellfish, poultry, eggs, beans, raisins, whole-grain bread, and leafy green vegetables. · Steam vegetables to help them keep their iron content. When should you call for help? Call 911 anytime you think you may need emergency care. For example, call if:    · You have symptoms of a heart attack. These may include:  ? Chest pain or pressure, or a strange feeling in the chest.  ? Sweating. ? Shortness of breath. ? Nausea or vomiting. ? Pain, pressure, or a strange feeling in the back, neck, jaw, or upper belly or in one or both shoulders or arms. ? Lightheadedness or sudden weakness. ? A fast or irregular heartbeat. After you call  911, the  may tell you to chew 1 adult-strength or 2 to 4 low-dose aspirin. Wait for an ambulance.  Do not try to drive yourself.     · You passed out (lost consciousness).    Call your doctor now or seek immediate medical care if:    · You have new or increased shortness of breath.     · You are dizzy or lightheaded, or you feel like you may faint.     · Your fatigue and weakness continue or get worse.     · You have any abnormal bleeding, such as:  ? Nosebleeds. ? Vaginal bleeding that is different (heavier, more frequent, at a different time of the month) than what you are used to.  ? Bloody or black stools, or rectal bleeding. ? Bloody or pink urine.    Watch closely for changes in your health, and be sure to contact your doctor if:    · You do not get better as expected. Where can you learn more? Go to http://chiki-kervin.info/  Enter R301 in the search box to learn more about \"Anemia: Care Instructions. \"  Current as of: November 7, 2019Content Version: 12.4  © 4528-0407 [employer]. Care instructions adapted under license by [employer] (which disclaims liability or warranty for this information). If you have questions about a medical condition or this instruction, always ask your healthcare professional. [place] [address] any warranty or liability for your use of this information. Patient Education        Headache: Care Instructions  Your Care Instructions    Headaches have many possible causes. Most headaches aren't a sign of a more serious problem, and they will get better on their own. Home treatment may help you feel better faster. The doctor has checked you carefully, but problems can develop later. If you notice any problems or new symptoms, get medical treatment right away. Follow-up care is a key part of your treatment and safety. Be sure to make and go to all appointments, and call your doctor if you are having problems. It's also a good idea to know your test results and keep a list of the medicines you take. How can you care for yourself at home? · Do not drive if you have taken a prescription pain medicine. · Rest in a quiet, dark room until your headache is gone. Close your eyes and try to relax or go to sleep. Don't watch TV or read. · Put a cold, moist cloth or cold pack on the painful area for 10 to 20 minutes at a time. Put a thin cloth between the cold pack and your skin. · Use a warm, moist towel or a heating pad set on low to relax tight shoulder and neck muscles. · Have someone gently massage your neck and shoulders. · Take pain medicines exactly as directed. ? If the doctor gave you a prescription medicine for pain, take it as prescribed. ? If you are not taking a prescription pain medicine, ask your doctor if you can take an over-the-counter medicine. · Be careful not to take pain medicine more often than the instructions allow, because you may get worse or more frequent headaches when the medicine wears off. · Do not ignore new symptoms that occur with a headache, such as a fever, weakness or numbness, vision changes, or confusion. These may be signs of a more serious problem. To prevent headaches  · Keep a headache diary so you can figure out what triggers your headaches. Avoiding triggers may help you prevent headaches. Record when each headache began, how long it lasted, and what the pain was like (throbbing, aching, stabbing, or dull). Write down any other symptoms you had with the headache, such as nausea, flashing lights or dark spots, or sensitivity to bright light or loud noise. Note if the headache occurred near your period. List anything that might have triggered the headache, such as certain foods (chocolate, cheese, wine) or odors, smoke, bright light, stress, or lack of sleep. · Find healthy ways to deal with stress. Headaches are most common during or right after stressful times. Take time to relax before and after you do something that has caused a headache in the past.  · Try to keep your muscles relaxed by keeping good posture. Check your jaw, face, neck, and shoulder muscles for tension, and try relaxing them.  When sitting at a desk, change positions often, and stretch for 30 seconds each hour. · Get plenty of sleep and exercise. · Eat regularly and well. Long periods without food can trigger a headache. · Treat yourself to a massage. Some people find that regular massages are very helpful in relieving tension. · Limit caffeine by not drinking too much coffee, tea, or soda. But don't quit caffeine suddenly, because that can also give you headaches. · Reduce eyestrain from computers by blinking frequently and looking away from the computer screen every so often. Make sure you have proper eyewear and that your monitor is set up properly, about an arm's length away. · Seek help if you have depression or anxiety. Your headaches may be linked to these conditions. Treatment can both prevent headaches and help with symptoms of anxiety or depression. When should you call for help? Call 911 anytime you think you may need emergency care. For example, call if:    · You have signs of a stroke. These may include:  ? Sudden numbness, paralysis, or weakness in your face, arm, or leg, especially on only one side of your body. ? Sudden vision changes. ? Sudden trouble speaking. ? Sudden confusion or trouble understanding simple statements. ? Sudden problems with walking or balance. ? A sudden, severe headache that is different from past headaches.    Call your doctor now or seek immediate medical care if:    · You have a new or worse headache.     · Your headache gets much worse. Where can you learn more? Go to http://chiki-kervin.info/  Enter M271 in the search box to learn more about \"Headache: Care Instructions. \"  Current as of: November 19, 2019Content Version: 12.4  © 5796-1640 [employer]. Care instructions adapted under license by [employer] (which disclaims liability or warranty for this information).  If you have questions about a medical condition or this instruction, always ask your healthcare professional. [address] any warranty or liability for your use of this information.

## 2020-04-29 NOTE — ED NOTES
Patient discharged and given discharge instructions by JEAN PAUL [facility]. Patient ambulatory from ED. Patient accompanied by self.

## 2020-04-29 NOTE — ED PROVIDER NOTES
EMERGENCY DEPARTMENT HISTORY AND PHYSICAL EXAM    Date: 4/29/2020  Patient Name: Ezekiel Herrmann    History of Presenting Illness     Chief Complaint   Patient presents with    Headache    Generalized Body Aches    Vomiting         History Provided By: Patient    Additional History (Context): Ezekiel Herrmann is a 45 y.o. female with obesity and Anemia, GERD, chronic knee pain who presents with complaint of a headache nausea vomiting. She states she was seen in the emergency department 2 days ago felt improved went home and just really has not turned the corner. She is not taking any medications at home. Denies any recent head trauma numbness weakness changes in speech or vision. She denies any abdominal pain. She was on her menstrual cycle when she first came for evaluation a couple of days ago but has since stopped bleeding. She said her flow is normal for her. PCP: None    Current Facility-Administered Medications   Medication Dose Route Frequency Provider Last Rate Last Dose    metoclopramide HCl (REGLAN) injection 10 mg  10 mg IntraMUSCular NOW Jacquelin Bear PA        diphenhydrAMINE (BENADRYL) injection 50 mg  50 mg IntraMUSCular ONCE Jacquelin Bear PA         Current Outpatient Medications   Medication Sig Dispense Refill    butalbital-acetaminophen-caff (Fioricet) -40 mg per capsule Take 1 Cap by mouth every four (4) hours as needed (Take 1-2 tablets ever 4 hours as need for pain.  Not to exceed 6 tablets in a 24 hour period. ). 6 Cap 0    ondansetron hcl (Zofran) 4 mg tablet Take 2 Tabs by mouth every eight (8) hours as needed for Nausea. 12 Tab 0    ferrous sulfate 325 mg (65 mg iron) tablet Take 1 Tab by mouth two (2) times a day. 61 Tab 1       Past History     Past Medical History:  Past Medical History:   Diagnosis Date    Anemia     Chronic knee pain     GERD (gastroesophageal reflux disease)        Past Surgical History:  No past surgical history on file.     Family History:  No family history on file. Social History:  Social History     Tobacco Use    Smoking status: Never Smoker    Smokeless tobacco: Never Used   Substance Use Topics    Alcohol use: Yes     Comment: socially, occasionally    Drug use: No       Allergies:  No Known Allergies      Review of Systems   Review of Systems   Constitutional: Positive for fatigue. Negative for fever. Respiratory: Negative for shortness of breath. Cardiovascular: Negative for chest pain. Gastrointestinal: Positive for nausea and vomiting. Negative for abdominal pain. Neurological: Positive for headaches. Negative for dizziness, weakness, light-headedness and numbness. All Other Systems Negative  Physical Exam     Vitals:    04/29/20 1512 04/29/20 1545   BP: 144/87    Pulse: 88    Resp: 16    Temp: 98.9 °F (37.2 °C)    SpO2: 98% 98%   Weight: 98.2 kg (216 lb 8 oz)    Height: 5' 6\" (1.676 m)      Physical Exam  Vitals signs and nursing note reviewed. Constitutional:       Appearance: She is well-developed. She is obese. She is not ill-appearing. HENT:      Head: Normocephalic and atraumatic. Eyes:      Extraocular Movements: Extraocular movements intact. Pupils: Pupils are equal, round, and reactive to light. Comments: No nystagmus. Neck:      Thyroid: No thyromegaly. Vascular: No JVD. Trachea: No tracheal deviation. Cardiovascular:      Rate and Rhythm: Normal rate and regular rhythm. Heart sounds: Normal heart sounds. No murmur. No friction rub. No gallop. Pulmonary:      Effort: Pulmonary effort is normal. No respiratory distress. Breath sounds: Normal breath sounds. No stridor. No wheezing or rales. Chest:      Chest wall: No tenderness. Abdominal:      General: There is no distension. Palpations: Abdomen is soft. There is no mass. Tenderness: There is no abdominal tenderness. There is no guarding or rebound.    Musculoskeletal:         General: No tenderness. Lymphadenopathy:      Cervical: No cervical adenopathy. Skin:     General: Skin is warm and dry. Coloration: Skin is not pale. Findings: No erythema or rash. Neurological:      Mental Status: She is alert and oriented to person, place, and time. Comments: Negative Romberg. No dysdiadochokinesis, past-pointing, or tremor. Normal heel shin. Psychiatric:         Behavior: Behavior normal.         Thought Content: Thought content normal.            Diagnostic Study Results     Labs -   No results found for this or any previous visit (from the past 12 hour(s)). Radiologic Studies -   No orders to display     CT Results  (Last 48 hours)    None        CXR Results  (Last 48 hours)    None            Medical Decision Making   I am the first provider for this patient. I reviewed the vital signs, available nursing notes, past medical history, past surgical history, family history and social history. Vital Signs-Reviewed the patient's vital signs. Records Reviewed: Nursing Notes, Old Medical Records and Previous Laboratory Studies    Procedures:  Procedures    Provider Notes (Medical Decision Making): Treat her headache and nausea. She has no abdominal pain just finished her menstrual cycle. She did not have any significant bleeding in the past 2 days. We will plan to send her home for something for her nausea and her headache. She is not followed by neurology but by the outpatient clinic. Advised her close follow-up.     MED RECONCILIATION:  Current Facility-Administered Medications   Medication Dose Route Frequency    metoclopramide HCl (REGLAN) injection 10 mg  10 mg IntraMUSCular NOW    diphenhydrAMINE (BENADRYL) injection 50 mg  50 mg IntraMUSCular ONCE     Current Outpatient Medications   Medication Sig    butalbital-acetaminophen-caff (Fioricet) -40 mg per capsule Take 1 Cap by mouth every four (4) hours as needed (Take 1-2 tablets ever 4 hours as need for pain.  Not to exceed 6 tablets in a 24 hour period. ).  ondansetron hcl (Zofran) 4 mg tablet Take 2 Tabs by mouth every eight (8) hours as needed for Nausea.  ferrous sulfate 325 mg (65 mg iron) tablet Take 1 Tab by mouth two (2) times a day. Disposition:  home    DISCHARGE NOTE:   4:38 PM    Pt has been reexamined. Patient has no new complaints, changes, or physical findings. Care plan outlined and precautions discussed. Results of exam, labs were reviewed with the patient. All medications were reviewed with the patient; will d/c home with fioricet, zofran. All of pt's questions and concerns were addressed. Patient was instructed and agrees to follow up with PCP, as well as to return to the ED upon further deterioration. Patient is ready to go home. Follow-up Information     Follow up With Specialties Details Why Carlin Janiya Weir  Schedule an appointment as soon as possible for a visit in 1 day  [phone]  [phone]    [facility] Emergency Medicine  If symptoms worsen return immediately [address]  [phone]          Current Discharge Medication List      START taking these medications    Details   butalbital-acetaminophen-caff (Fioricet) -40 mg per capsule Take 1 Cap by mouth every four (4) hours as needed (Take 1-2 tablets ever 4 hours as need for pain.  Not to exceed 6 tablets in a 24 hour period. ). Qty: 6 Cap, Refills: 0      ondansetron hcl (Zofran) 4 mg tablet Take 2 Tabs by mouth every eight (8) hours as needed for Nausea. Qty: 12 Tab, Refills: 0               Diagnosis     Clinical Impression:   1. Nonintractable headache, unspecified chronicity pattern, unspecified headache type    2.  Symptomatic anemia

## 2020-04-29 NOTE — LETTER
NOTIFICATION RETURN TO WORK / SCHOOL 
 
4/29/2020 4:53 PM 
 
Ms. Lele Vivar [address] [facility] [address] To Whom It May Concern: 
 
Lele Vivar [facility] the [facility]. She will return to work/school on: 5/1/20. If there are questions or concerns please have the patient contact our office.  
 
 
 
Sincerely, 
 
 
Tung Trejo PA-C

## 2020-04-30 ENCOUNTER — TELEPHONE (OUTPATIENT)
Dept: ONCOLOGY | Age: 40
End: 2020-04-30

## 2020-05-04 ENCOUNTER — PATIENT OUTREACH (OUTPATIENT)
Dept: INTERNAL MEDICINE CLINIC | Age: 40
End: 2020-05-04

## 2020-05-04 NOTE — PROGRESS NOTES
Comment in LOOP from patient alert noted in remote COVID-19 Loop Symptom Monitoring Program. Messaged patient to notify Rafael Paulino if symptoms have worsened since yesterday. The following response was sent to the patient inLOOP      Thanks for checking in. Keep an eye out for any color change or if your toes and feet become cool or cold. As a prediabetic, it is very important to take extra care of or feet. Wear shoes that fit well but do not rub on your toes, ankle, or heel. It is a very good idea to see a podiatrist on a regular basis also. Also, keep watch for your feet swelling and sit with them up when you can. If you wear shoes that expose your toes or heel be very careful to avoid injury. , If your toes continue to feel numb please contact your PCP as soon as possible.

## 2020-05-29 ENCOUNTER — APPOINTMENT (OUTPATIENT)
Dept: GENERAL RADIOLOGY | Age: 40
End: 2020-05-29
Attending: EMERGENCY MEDICINE
Payer: MEDICAID

## 2020-05-29 ENCOUNTER — HOSPITAL ENCOUNTER (EMERGENCY)
Age: 40
Discharge: HOME | End: 2020-05-29
Attending: EMERGENCY MEDICINE
Payer: MEDICAID

## 2020-05-29 VITALS
DIASTOLIC BLOOD PRESSURE: 68 MMHG | BODY MASS INDEX: 35.87 KG/M2 | HEART RATE: 71 BPM | SYSTOLIC BLOOD PRESSURE: 102 MMHG | TEMPERATURE: 97.5 F | RESPIRATION RATE: 16 BRPM | OXYGEN SATURATION: 98 % | WEIGHT: 223.2 LBS | HEIGHT: 66 IN

## 2020-05-29 DIAGNOSIS — Z20.822 SUSPECTED COVID-19 VIRUS INFECTION: ICD-10-CM

## 2020-05-29 DIAGNOSIS — B34.9 VIRAL SYNDROME: Primary | ICD-10-CM

## 2020-05-29 LAB
ALBUMIN SERPL-MCNC: 3.3 G/DL (ref 3.4–5)
ALBUMIN/GLOB SERPL: 0.8 {RATIO} (ref 0.8–1.7)
ALP SERPL-CCNC: 57 U/L (ref 45–117)
ALT SERPL-CCNC: 16 U/L (ref 13–56)
ANION GAP SERPL CALC-SCNC: 4 MMOL/L (ref 3–18)
AST SERPL-CCNC: 12 U/L (ref 10–38)
ATRIAL RATE: 76 BPM
BASOPHILS # BLD: 0 K/UL (ref 0–0.1)
BASOPHILS NFR BLD: 0 % (ref 0–2)
BILIRUB SERPL-MCNC: 0.4 MG/DL (ref 0.2–1)
BUN SERPL-MCNC: 10 MG/DL (ref 7–18)
BUN/CREAT SERPL: 13 (ref 12–20)
CALCIUM SERPL-MCNC: 8.4 MG/DL (ref 8.5–10.1)
CALCULATED P AXIS, ECG09: 56 DEGREES
CALCULATED R AXIS, ECG10: 31 DEGREES
CALCULATED T AXIS, ECG11: 2 DEGREES
CHLORIDE SERPL-SCNC: 112 MMOL/L (ref 100–111)
CK MB CFR SERPL CALC: NORMAL % (ref 0–4)
CK MB SERPL-MCNC: <1 NG/ML (ref 5–25)
CK SERPL-CCNC: 104 U/L (ref 26–192)
CO2 SERPL-SCNC: 25 MMOL/L (ref 21–32)
CREAT SERPL-MCNC: 0.78 MG/DL (ref 0.6–1.3)
DIAGNOSIS, 93000: NORMAL
DIFFERENTIAL METHOD BLD: ABNORMAL
EOSINOPHIL # BLD: 0.1 K/UL (ref 0–0.4)
EOSINOPHIL NFR BLD: 1 % (ref 0–5)
ERYTHROCYTE [DISTWIDTH] IN BLOOD BY AUTOMATED COUNT: 20.4 % (ref 11.6–14.5)
GLOBULIN SER CALC-MCNC: 4 G/DL (ref 2–4)
GLUCOSE SERPL-MCNC: 86 MG/DL (ref 74–99)
HCG SERPL-ACNC: <1 MIU/ML (ref 0–10)
HCT VFR BLD AUTO: 24.8 % (ref 35–45)
HGB BLD-MCNC: 7.9 G/DL (ref 12–16)
LYMPHOCYTES # BLD: 1.6 K/UL (ref 0.9–3.6)
LYMPHOCYTES NFR BLD: 28 % (ref 21–52)
MCH RBC QN AUTO: 18.4 PG (ref 24–34)
MCHC RBC AUTO-ENTMCNC: 31.9 G/DL (ref 31–37)
MCV RBC AUTO: 57.8 FL (ref 74–97)
MONOCYTES # BLD: 0.6 K/UL (ref 0.05–1.2)
MONOCYTES NFR BLD: 10 % (ref 3–10)
NEUTS SEG # BLD: 3.4 K/UL (ref 1.8–8)
NEUTS SEG NFR BLD: 61 % (ref 40–73)
P-R INTERVAL, ECG05: 172 MS
PLATELET # BLD AUTO: 349 K/UL (ref 135–420)
PLATELET COMMENTS,PCOM: ABNORMAL
PMV BLD AUTO: 9.4 FL (ref 9.2–11.8)
POTASSIUM SERPL-SCNC: 3.6 MMOL/L (ref 3.5–5.5)
PROT SERPL-MCNC: 7.3 G/DL (ref 6.4–8.2)
Q-T INTERVAL, ECG07: 392 MS
QRS DURATION, ECG06: 78 MS
QTC CALCULATION (BEZET), ECG08: 441 MS
RBC # BLD AUTO: 4.29 M/UL (ref 4.2–5.3)
RBC MORPH BLD: ABNORMAL
SODIUM SERPL-SCNC: 141 MMOL/L (ref 136–145)
TROPONIN I SERPL-MCNC: <0.02 NG/ML (ref 0–0.04)
VENTRICULAR RATE, ECG03: 76 BPM
WBC # BLD AUTO: 5.7 K/UL (ref 4.6–13.2)

## 2020-05-29 PROCEDURE — 71045 X-RAY EXAM CHEST 1 VIEW: CPT

## 2020-05-29 PROCEDURE — 82550 ASSAY OF CK (CPK): CPT

## 2020-05-29 PROCEDURE — 93005 ELECTROCARDIOGRAM TRACING: CPT

## 2020-05-29 PROCEDURE — 85025 COMPLETE CBC W/AUTO DIFF WBC: CPT

## 2020-05-29 PROCEDURE — 80053 COMPREHEN METABOLIC PANEL: CPT

## 2020-05-29 PROCEDURE — 99283 EMERGENCY DEPT VISIT LOW MDM: CPT

## 2020-05-29 PROCEDURE — 87635 SARS-COV-2 COVID-19 AMP PRB: CPT

## 2020-05-29 PROCEDURE — 84702 CHORIONIC GONADOTROPIN TEST: CPT

## 2020-05-29 NOTE — LETTER
[address] [place] [address] Dr [facility] EM[facility]ENCY [facility]PT 
[address] [facility] [address] [phone] Work/School Note Date: 5/29/2020 To Whom It May concern: 
 
Raad Beltran was seen and treated today in the emergency room by the following provider(s): 
Attending Provider: [place] Michael may return to work on June 1, 2020.  
 
Sincerely, 
 
 
 
 
Alan Harrison RN

## 2020-05-29 NOTE — DISCHARGE INSTRUCTIONS
Patient Education        Viral Infections: Care Instructions  Your Care Instructions     You don't feel well, but it's not clear what's causing it. You may have a viral infection. Viruses cause many illnesses, such as the common cold, influenza, fever, rashes, and the diarrhea, nausea, and vomiting that are often called \"stomach flu. \" You may wonder if antibiotic medicines could make you feel better. But antibiotics only treat infections caused by bacteria. They don't work on viruses. The good news is that viral infections usually aren't serious. Most will go away in a few days without medical treatment. In the meantime, there are a few things you can do to make yourself more comfortable. Follow-up care is a key part of your treatment and safety. Be sure to make and go to all appointments, and call your doctor if you are having problems. It's also a good idea to know your test results and keep a list of the medicines you take. How can you care for yourself at home? · Get plenty of rest if you feel tired. · Take an over-the-counter pain medicine if needed, such as acetaminophen (Tylenol), ibuprofen (Advil, Motrin), or naproxen (Aleve). Read and follow all instructions on the label. · Be careful when taking over-the-counter cold or flu medicines and Tylenol at the same time. Many of these medicines have acetaminophen, which is Tylenol. Read the labels to make sure that you are not taking more than the recommended dose. Too much acetaminophen (Tylenol) can be harmful. · Drink plenty of fluids, enough so that your urine is light yellow or clear like water. If you have kidney, heart, or liver disease and have to limit fluids, talk with your doctor before you increase the amount of fluids you drink. · Stay home from work, school, and other public places while you have a fever. When should you call for help? [id number] anytime you think you may need emergency care.  For example, call if:  · You have severe trouble breathing. · You passed out (lost consciousness). Call your doctor now or seek immediate medical care if:  · You seem to be getting much sicker. · You have a new or higher fever. · You have blood in your stools. · You have new belly pain, or your pain gets worse. · You have a new rash. Watch closely for changes in your health, and be sure to contact your doctor if:  · You start to get better and then get worse. · You do not get better as expected. Where can you learn more? Go to http://chiki-kervin.info/  Enter L906 in the search box to learn more about \"Viral Infections: Care Instructions. \"  Current as of: February 11, 2020               Content Version: 12.5  © 2006-2020 Healthwise[employer] Incorporated. Care instructions adapted under license by [employer] (which disclaims liability or warranty for this information). If you have questions about a medical condition or this instruction, always ask your healthcare professional. [address] any warranty or liability for your use of this information.

## 2020-05-29 NOTE — ED TRIAGE NOTES
Pt reports near syncope, CP, SOB that started this AM. Pt reports that she was seeing spots. Pt reports these symptoms are continuous.

## 2020-05-29 NOTE — ED PROVIDER NOTES
EMERGENCY DEPARTMENT HISTORY AND PHYSICAL EXAM    9:43 AM      Date: 5/29/2020  Patient Name: Lakesha Meehan    History of Presenting Illness     Chief Complaint   Patient presents with    Dizziness         History Provided By: Patient    Additional History (Context): Lakesha Meehan is a 45 y.o. female with anemia who presents with shortness of breath, dizziness and seeing dark spots. Patient was at work at the [employer] and she developed the symptoms prior to arrival to the emergency room. She admits to a very mild cough. She denies fever, nausea, vomiting or diarrhea. She complains of mild chest tightness. Patient has had severe anemia in the past needing transfusion. She denies smoking, alcohol or recreational drug use. Other symptoms at this time. Patient has been using social distancing. She denies any sick contacts. PCP: None      Current Outpatient Medications   Medication Sig Dispense Refill    butalbital-acetaminophen-caff (Fioricet) -40 mg per capsule Take 1 Cap by mouth every four (4) hours as needed (Take 1-2 tablets ever 4 hours as need for pain.  Not to exceed 6 tablets in a 24 hour period. ). 6 Cap 0    ondansetron hcl (Zofran) 4 mg tablet Take 2 Tabs by mouth every eight (8) hours as needed for Nausea. 12 Tab 0    ferrous sulfate 325 mg (65 mg iron) tablet Take 1 Tab by mouth two (2) times a day. 61 Tab 1       Past History     Past Medical History:  Past Medical History:   Diagnosis Date    Anemia     Chronic knee pain     GERD (gastroesophageal reflux disease)        Past Surgical History:  History reviewed. No pertinent surgical history. Family History:  History reviewed. No pertinent family history.     Social History:  Social History     Tobacco Use    Smoking status: Never Smoker    Smokeless tobacco: Never Used   Substance Use Topics    Alcohol use: Yes     Comment: socially, occasionally    Drug use: No       Allergies:  No Known Allergies      Review of Systems       Review of Systems   Constitutional: Negative. Negative for chills, diaphoresis and fever. HENT: Negative. Negative for congestion, rhinorrhea and sore throat. Eyes: Negative. Negative for pain, discharge and redness. Respiratory: Positive for cough, chest tightness and shortness of breath. Negative for wheezing. Cardiovascular: Negative. Negative for chest pain. Gastrointestinal: Negative. Negative for abdominal pain, constipation, diarrhea, nausea and vomiting. Genitourinary: Negative. Negative for dysuria, flank pain, frequency, hematuria and urgency. Musculoskeletal: Negative. Negative for back pain and neck pain. Skin: Negative. Negative for rash. Neurological: Positive for dizziness. Negative for syncope, weakness, numbness and headaches. Psychiatric/Behavioral: Negative. All other systems reviewed and are negative. Physical Exam     Visit Vitals  /68 (BP 1 Location: Right arm, BP Patient Position: At rest)   Pulse 71   Temp 97.5 °F (36.4 °C)   Resp 16   Ht 5' 6\" (1.676 m)   Wt 101.2 kg (223 lb 3.2 oz)   SpO2 98%   BMI 36.03 kg/m²         Physical Exam  Vitals signs and nursing note reviewed. Constitutional:       General: She is not in acute distress. Appearance: Normal appearance. She is well-developed. She is not ill-appearing, toxic-appearing or diaphoretic. HENT:      Head: Normocephalic and atraumatic. Mouth/Throat:      Pharynx: No oropharyngeal exudate. Eyes:      General: No scleral icterus. Conjunctiva/sclera: Conjunctivae normal.      Pupils: Pupils are equal, round, and reactive to light. Neck:      Musculoskeletal: Normal range of motion and neck supple. Thyroid: No thyromegaly. Vascular: No hepatojugular reflux or JVD. Trachea: No tracheal deviation. Cardiovascular:      Rate and Rhythm: Normal rate and regular rhythm. Pulses: Normal pulses.            Radial pulses are 2+ on the right side and 2+ on the left side. Dorsalis pedis pulses are 2+ on the right side and 2+ on the left side. Heart sounds: Normal heart sounds, S1 normal and S2 normal. No murmur. No gallop. No S3 or S4 sounds. Pulmonary:      Effort: Pulmonary effort is normal. No respiratory distress. Breath sounds: Normal breath sounds. No decreased breath sounds, wheezing, rhonchi or rales. Abdominal:      General: Bowel sounds are normal. There is no distension. Palpations: Abdomen is soft. Abdomen is not rigid. There is no mass. Tenderness: There is no abdominal tenderness. There is no guarding or rebound. Negative signs include Hassan's sign and McBurney's sign. Musculoskeletal: Normal range of motion. Lymphadenopathy:      Head:      Right side of head: No submental, submandibular, preauricular or occipital adenopathy. Left side of head: No submental, submandibular, preauricular or occipital adenopathy. Cervical: No cervical adenopathy. Upper Body:      Right upper body: No supraclavicular adenopathy. Left upper body: No supraclavicular adenopathy. Skin:     General: Skin is warm and dry. Findings: No rash. Neurological:      Mental Status: She is alert. She is not disoriented. GCS: GCS eye subscore is 4. GCS verbal subscore is 5. GCS motor subscore is 6. Cranial Nerves: No cranial nerve deficit. Sensory: No sensory deficit. Coordination: Coordination normal.      Gait: Gait normal.      Deep Tendon Reflexes: Reflexes are normal and symmetric. Psychiatric:         Speech: Speech normal.         Behavior: Behavior normal.         Thought Content: Thought content normal.         Judgment: Judgment normal.           Diagnostic Study Results     Labs -  Recent Results (from the past 12 hour(s))   CBC WITH AUTOMATED DIFF    Collection Time: 05/29/20  9:30 AM   Result Value Ref Range    WBC 5.7 4.6 - 13.2 K/uL    RBC 4.29 4. 20 - 5.30 M/uL    HGB 7.9 (L) 12.0 - 16.0 g/dL    HCT 24.8 (L) 35.0 - 45.0 %    MCV 57.8 (L) 74.0 - 97.0 FL    MCH 18.4 (L) 24.0 - 34.0 PG    MCHC 31.9 31.0 - 37.0 g/dL    RDW 20.4 (H) 11.6 - 14.5 %    PLATELET 397 752 - 389 K/uL    MPV 9.4 9.2 - 11.8 FL    NEUTROPHILS 61 40 - 73 %    LYMPHOCYTES 28 21 - 52 %    MONOCYTES 10 3 - 10 %    EOSINOPHILS 1 0 - 5 %    BASOPHILS 0 0 - 2 %    ABS. NEUTROPHILS 3.4 1.8 - 8.0 K/UL    ABS. LYMPHOCYTES 1.6 0.9 - 3.6 K/UL    ABS. MONOCYTES 0.6 0.05 - 1.2 K/UL    ABS. EOSINOPHILS 0.1 0.0 - 0.4 K/UL    ABS. BASOPHILS 0.0 0.0 - 0.1 K/UL    DF AUTOMATED      PLATELET COMMENTS ADEQUATE PLATELETS      RBC COMMENTS ANISOCYTOSIS  1+        RBC COMMENTS POLYCHROMASIA  1+        RBC COMMENTS HYPOCHROMIA  1+        RBC COMMENTS MICROCYTOSIS  1+        RBC COMMENTS OVALOCYTES  1+        RBC COMMENTS TARGET CELLS  1+       METABOLIC PANEL, COMPREHENSIVE    Collection Time: 05/29/20  9:30 AM   Result Value Ref Range    Sodium 141 136 - 145 mmol/L    Potassium 3.6 3.5 - 5.5 mmol/L    Chloride 112 (H) 100 - 111 mmol/L    CO2 25 21 - 32 mmol/L    Anion gap 4 3.0 - 18 mmol/L    Glucose 86 74 - 99 mg/dL    BUN 10 7.0 - 18 MG/DL    Creatinine 0.78 0.6 - 1.3 MG/DL    BUN/Creatinine ratio 13 12 - 20      GFR est AA >60 >60 ml/min/1.73m2    GFR est non-AA >60 >60 ml/min/1.73m2    Calcium 8.4 (L) 8.5 - 10.1 MG/DL    Bilirubin, total 0.4 0.2 - 1.0 MG/DL    ALT (SGPT) 16 13 - 56 U/L    AST (SGOT) 12 10 - 38 U/L    Alk.  phosphatase 57 45 - 117 U/L    Protein, total 7.3 6.4 - 8.2 g/dL    Albumin 3.3 (L) 3.4 - 5.0 g/dL    Globulin 4.0 2.0 - 4.0 g/dL    A-G Ratio 0.8 0.8 - 1.7     CARDIAC PANEL,(CK, CKMB & TROPONIN)    Collection Time: 05/29/20  9:30 AM   Result Value Ref Range    CK - MB <1.0 <3.6 ng/ml    CK-MB Index  0.0 - 4.0 %     CALCULATION NOT PERFORMED WHEN RESULT IS BELOW LINEAR LIMIT     26 - 192 U/L    Troponin-I, QT <0.02 0.0 - 0.045 NG/ML   BETA HCG, QT    Collection Time: 05/29/20  9:30 AM   Result Value Ref Range    Beta HCG, QT <1 0 - 10 MIU/ML   EKG, 12 LEAD, INITIAL    Collection Time: 05/29/20  9:42 AM   Result Value Ref Range    Ventricular Rate 76 BPM    Atrial Rate 76 BPM    P-R Interval 172 ms    QRS Duration 78 ms    Q-T Interval 392 ms    QTC Calculation (Bezet) 441 ms    Calculated P Axis 56 degrees    Calculated R Axis 31 degrees    Calculated T Axis 2 degrees    Diagnosis       Normal sinus rhythm  Nonspecific T wave abnormality  Abnormal ECG  When compared with ECG of 27-APR-2020 15:04,  No significant change was found  Confirmed by Dread Wilson ([id number]) on 5/29/2020 11:26:27 AM         Radiologic Studies -   XR CHEST PORT   Final Result   IMPRESSION:   No radiographic evidence of acute cardiopulmonary process. Medical Decision Making   Provider Notes (Medical Decision Making):  MDM  Number of Diagnoses or Management Options  Diagnosis management comments: Shortness of breath etiologies include chronic obstructive pulmonary disease (COPD), acute asthma exacerbation, congestive heart failure, pneumonia, acute bronchitis, pulmonary embolism, upper respiratory infection, cardiac event to include acute coronary syndrome, acute myocardial infarction or a combination of the above (ex URI on top of COPD thus causing respiratory distress). I am the first provider for this patient. I reviewed the vital signs, available nursing notes, past medical history, past surgical history, family history and social history. Vital Signs-Reviewed the patient's vital signs. Records Reviewed: Nursing Notes (Time of Review: 9:43 AM)    ED Course: Progress Notes, Reevaluation, and Consults:    Labs essentially normal.  Chest X-Ray showed No acute process. EKG showed NSR with rate of 76 bpm. With no ST elevations or depression and non specific T wave changes. 11:23 AM 5/29/2020      Diagnosis       I have reassessed the patient. Patient is feeling well. Patient will not be prescribed medications.   Low suspicion for COVID. Tests sent. Patient to be called with results. Patient was discharged in stable condition. Patient is to return to emergency department if any new or worsening condition. Clinical Impression:   1. Viral syndrome    2. Suspected COVID-19 virus infection        Disposition: Discharged home     Follow-up Information     Follow up With Specialties Details Why [address]  In 2 days  [place]  [address]  [address] [facility] [address]             Attestation        Provider Attestation:     I personally performed the services described in the documentation, reviewed the documentation and it accurately and completely records my words and actions utilizing the [address] [place] May 29, 2020 at 11:39 AM - Armando Avina DO    Disclaimer. It is dictated using utilizing voice recognition software. Unfortunately this leads to occasional typographical errors. I apologize in advance if the situation occurs. If questions arise please do not hesitate to contact me or call our department.

## 2020-06-01 LAB — SARS-COV-2, COV2NT: NOT DETECTED

## 2020-07-27 ENCOUNTER — HOSPITAL ENCOUNTER (EMERGENCY)
Age: 40
Discharge: LEFT BEFORE DISCHARGE | End: 2020-07-27

## 2020-07-27 PROCEDURE — 75810000275 HC EMERGENCY DEPT VISIT NO LEVEL OF CARE

## 2020-08-15 ENCOUNTER — HOSPITAL ENCOUNTER (EMERGENCY)
Age: 40
Discharge: HOME | End: 2020-08-15
Attending: EMERGENCY MEDICINE
Payer: MEDICAID

## 2020-08-15 VITALS
HEIGHT: 66 IN | SYSTOLIC BLOOD PRESSURE: 130 MMHG | BODY MASS INDEX: 33.75 KG/M2 | HEART RATE: 76 BPM | OXYGEN SATURATION: 100 % | WEIGHT: 210 LBS | TEMPERATURE: 98.7 F | RESPIRATION RATE: 14 BRPM | DIASTOLIC BLOOD PRESSURE: 80 MMHG

## 2020-08-15 DIAGNOSIS — K08.89 DENTALGIA: Primary | ICD-10-CM

## 2020-08-15 PROCEDURE — 99282 EMERGENCY DEPT VISIT SF MDM: CPT

## 2020-08-15 RX ORDER — NAPROXEN 500 MG/1
500 TABLET ORAL 2 TIMES DAILY WITH MEALS
Qty: 20 TAB | Refills: 0 | OUTPATIENT
Start: 2020-08-15 | End: 2020-08-21

## 2020-08-15 RX ORDER — PENICILLIN V POTASSIUM 500 MG/1
500 TABLET, FILM COATED ORAL 2 TIMES DAILY
Qty: 20 TAB | Refills: 0 | Status: SHIPPED | OUTPATIENT
Start: 2020-08-15 | End: 2020-08-25

## 2020-08-15 NOTE — DISCHARGE INSTRUCTIONS
f  Patient Education        Head or Face Pain: Care Instructions  Your Care Instructions     Common causes of head or face pain are allergies, stress, and injuries. Other causes include tooth problems and sinus infections. Eating certain foods, such as chocolate or cheese, or drinking certain liquids, such as coffee or cola, can cause head pain for some people. If you have mild head pain, you may not need treatment. It is important to watch your symptoms and talk to your doctor if your pain continues or gets worse. Follow-up care is a key part of your treatment and safety. Be sure to make and go to all appointments, and call your doctor if you are having problems. It's also a good idea to know your test results and keep a list of the medicines you take. How can you care for yourself at home? · Take pain medicines exactly as directed. ? If the doctor gave you a prescription medicine for pain, take it as prescribed. ? If you are not taking a prescription pain medicine, ask your doctor if you can take an over-the-counter pain medicine. · Take it easy for the next few days or longer if you are not feeling well. · Use a warm, moist towel or heating pad set on low to relax tight muscles in your shoulder and neck. Have someone gently massage your neck and shoulders. · Put ice or a cold pack on the area for 10 to 20 minutes at a time. Put a thin cloth between the ice and your skin. When should you call for help? [id number] anytime you think you may need emergency care. For example, call if:  · You have twitching, jerking, or a seizure. · You passed out (lost consciousness). · You have symptoms of a stroke. These may include:  ? Sudden numbness, tingling, weakness, or loss of movement in your face, arm, or leg, especially on only one side of your body. ? Sudden vision changes. ? Sudden trouble speaking. ? Sudden confusion or trouble understanding simple statements.   ? Sudden problems with walking or balance. ? A sudden, severe headache that is different from past headaches. · You have jaw pain and pain in your chest, shoulder, neck, or arm. Call your doctor now or seek immediate medical care if:  · You have a fever with a stiff neck or a severe headache. · You have nausea and vomiting, or you cannot keep food or liquids down. Watch closely for changes in your health, and be sure to contact your doctor if:  · Your head or face pain does not get better as expected. Where can you learn more? Go to http://chiki-kervin.info/  Enter P568 in the search box to learn more about \"Head or Face Pain: Care Instructions. \"  Current as of: June 26, 2019               Content Version: 12.5  © 0835-8701 [employer]. Care instructions adapted under license by [employer] (which disclaims liability or warranty for this information). If you have questions about a medical condition or this instruction, always ask your healthcare professional. [place] [address] any warranty or liability for your use of this information. Patient Education        Tooth and Gum Pain: Care Instructions  Your Care Instructions    The most common causes of dental pain are tooth decay and gum disease. Pain can also be caused by an infection of the tooth (abscess) or the gums. Or you may have pain from a broken or cracked tooth. Other causes of pain include infection and damage to a tooth from nervous grinding of your teeth. A wisdom tooth can be painful when it is coming in but cannot break through the gum. It can also be painful when the tooth is only partway in and extra gum tissue has formed around it. The tissue can get inflamed (pericoronitis), and sometimes it gets infected. Prompt dental care can help find the cause of your toothache and keep the tooth from dying or gum disease from getting worse. Self-care at home may reduce your pain and discomfort.   Follow-up care is a key part of your treatment and safety. Be sure to make and go to all appointments, and call your dentist or doctor if you are having problems. It's also a good idea to know your test results and keep a list of the medicines you take. How can you care for yourself at home? · To reduce pain and facial swelling, put an ice or cold pack on the outside of your cheek for 10 to 20 minutes at a time. Put a thin cloth between the ice and your skin. Do not use heat. · If your doctor prescribed antibiotics, take them as directed. Do not stop taking them just because you feel better. You need to take the full course of antibiotics. · Ask your doctor if you can take an over-the-counter pain medicine, such as acetaminophen (Tylenol), ibuprofen (Advil, Motrin), or naproxen (Aleve). Be safe with medicines. Read and follow all instructions on the label. · Avoid very hot, cold, or sweet foods and drinks if they increase your pain. · Rinse your mouth with warm salt water every 2 hours to help relieve pain and swelling. Mix 1 teaspoon of salt in 8 ounces of water. · Talk to your dentist about using special toothpaste for sensitive teeth. To reduce pain on contact with heat or cold or when brushing, brush with this toothpaste regularly or rub a small amount of the paste on the sensitive area with a clean finger 2 or 3 times a day. Floss gently between your teeth. · Do not smoke or use spit tobacco. Tobacco use can make gum problems worse, decreases your ability to fight infection in your gums, and delays healing. If you need help quitting, talk to your doctor about stop-smoking programs and medicines. These can increase your chances of quitting for good. When should you call for help? [id number] anytime you think you may need emergency care. For example, call if:  · You have trouble breathing.   Call your dentist or doctor now or seek immediate medical care if:  · You have signs of infection, such as:  ? Increased pain, swelling, warmth, or redness. ? Red streaks leading from the area. ? Pus draining from the area. ? A fever. Watch closely for changes in your health, and be sure to contact your doctor if:  · You do not get better as expected. Where can you learn more? Go to http://chiki-kervin.info/  Enter H417 in the search box to learn more about \"Tooth and Gum Pain: Care Instructions. \"  Current as of: March 25, 2020               Content Version: 12.5  © 2006-2020 [employer]. Care instructions adapted under license by [employer] (which disclaims liability or warranty for this information). If you have questions about a medical condition or this instruction, always ask your healthcare professional. [address] any warranty or liability for your use of this information. Patient Education        Periodontal Conditions: Care Instructions  Your Care Instructions    Periodontal conditions affect the gums, bone, and tissue that surround and support the teeth. The most common problems are caused by plaque. Plaque is a thin film of bacteria that sticks to teeth above and below the gum line. It can build up and harden into tartar. The bacteria in plaque and tartar can cause gum disease. Gingivitis is a disease that affects the gums (gingiva). The gums are the soft tissue that surrounds the teeth. Gingivitis causes red, swollen, tender gums that bleed easily when brushed, persistent bad breath, and sensitive teeth. Because it is not painful, many people do not get treatment when they should. Gingivitis can be reversed with good dental care. Periodontitis is a more advanced disease that affects more than the gums. The gums pull away from the teeth. This leaves deep pockets where bacteria can grow. The disease can damage the bones that support the teeth. The teeth may get loose and fall out. A periodontal condition should be treated as soon as it is found.  Finding gum problems early, treating them right away, and having regular checkups bring the best results. You can treat mild periodontal conditions by brushing and flossing your teeth every day. Your dentist may prescribe a mouthwash to kill the bacteria that can damage teeth and gums. Your dentist may have you take antibiotics to treat infection from moderate periodontal disease. If your gums have pulled away from your teeth, you may need cleaning between the teeth and gums right down to the teeth roots. This is called root planing and scaling. If you have severe periodontal disease, you may need surgery to remove diseased gum tissue or repair bone damage. Follow-up care is a key part of your treatment and safety. Be sure to make and go to all appointments, and call your dentist if you are having problems. It's also a good idea to know your test results and keep a list of the medicines you take. How can you care for yourself at home? · If your dentist prescribed antibiotics, take them as directed. Do not stop taking them just because you feel better. You need to take the full course of antibiotics. · Brush your teeth twice a day, in the morning and at night. ? Use a toothbrush with soft, rounded-end bristles and a head that is small enough to reach all parts of your teeth and mouth. Replace your toothbrush every 3 to 4 months. ? Use a fluoride toothpaste. ? Place the brush at a 45-degree angle where the teeth meet the gums. Press firmly, and gently rock the brush back and forth using small circular movements. ? Brush chewing surfaces vigorously with short back-and-forth strokes. ? Brush your tongue from back to front. · Floss at least once a day. Choose the type and flavor that you like best.  · Have your teeth cleaned by a professional at least twice a year. · Ask your dentist about using an antibacterial mouthwash to help reduce bacteria.   · Rinse your mouth with water or chew sugar-free gum after meals if you can't brush your teeth. · Do not smoke or use smokeless tobacco. Tobacco use can cause periodontal disease. When should you call for help? Call your dentist now or seek immediate medical care if:  · You have symptoms of infection, such as:  ? Increased pain, swelling, warmth, or redness. ? Red streaks leading from the area. ? Pus draining from the area. ? A fever. Watch closely for changes in your health, and be sure to contact your dentist if:  · You have new or worse tooth pain. · You do not get better as expected. Where can you learn more? Go to http://chiki-kervin.info/  Enter Z692 in the search box to learn more about \"Periodontal Conditions: Care Instructions. \"  Current as of: March 25, 2020               Content Version: 12.5  © 7683-3119 [employer]. Care instructions adapted under license by [employer] (which disclaims liability or warranty for this information). If you have questions about a medical condition or this instruction, always ask your healthcare professional. [place] [address] any warranty or liability for your use of this information.

## 2020-08-15 NOTE — ED PROVIDER NOTES
EMERGENCY DEPARTMENT HISTORY AND PHYSICAL EXAM    Date: 8/15/2020  Patient Name: Xavier Pace    History of Presenting Illness     Chief Complaint   Patient presents with    Dental Pain         History Provided By: Patient        Additional History (Context): Xavier Pace is a 44yoF here with left lower dental pain. No fever, chills or any other complaints. Non smoker. Needs dental referrals. Not pregnant    PCP: None    Current Outpatient Medications   Medication Sig Dispense Refill    butalbital-acetaminophen-caff (Fioricet) -40 mg per capsule Take 1 Cap by mouth every four (4) hours as needed (Take 1-2 tablets ever 4 hours as need for pain.  Not to exceed 6 tablets in a 24 hour period. ). 6 Cap 0    ondansetron hcl (Zofran) 4 mg tablet Take 2 Tabs by mouth every eight (8) hours as needed for Nausea. 12 Tab 0    ferrous sulfate 325 mg (65 mg iron) tablet Take 1 Tab by mouth two (2) times a day. 61 Tab 1       Past History     Past Medical History:  Past Medical History:   Diagnosis Date    Anemia     Chronic knee pain     GERD (gastroesophageal reflux disease)        Past Surgical History:  History reviewed. No pertinent surgical history. Family History:  History reviewed. No pertinent family history. Social History:  Social History     Tobacco Use    Smoking status: Never Smoker    Smokeless tobacco: Never Used   Substance Use Topics    Alcohol use: Yes     Comment: socially, occasionally    Drug use: No       Allergies:  No Known Allergies      Review of Systems   Review of Systems   Constitutional: Negative for chills and fever. HENT: Dental pain, negative for dysphagia  Eyes: Negative. Respiratory: Negative for cough and shortness of breath. Cardiovascular: Negative for chest pain and palpitations. Gastrointestinal: Negative for abdominal pain, constipation, diarrhea, nausea and vomiting. Genitourinary: Negative.   Negative for difficulty urinating and flank pain.   Musculoskeletal: Negative for back pain. Negative for gait problem and neck pain. Skin: Negative. Allergic/Immunologic: Negative. Neurological: Negative for dizziness, weakness, numbness and headaches. Psychiatric/Behavioral: Negative. All other systems reviewed and are negative. All Other Systems Negative  Physical Exam     Vitals:    08/15/20 0859   BP: 130/80   Pulse: 76   Resp: 14   Temp: 98.7 °F (37.1 °C)   SpO2: 100%   Weight: 95.3 kg (210 lb)   Height: 5' 6\" (1.676 m)     Physical Exam  Vitals signs and nursing note reviewed. Constitutional:       General: She is not in acute distress. Appearance: She is well-developed. She is not diaphoretic. HENT:      Head: Normocephalic and atraumatic. Nose: Nose normal.      Mouth/Throat:      Dentition: Abnormal dentition. Does not have dentures. Dental caries present. No dental tenderness, gingival swelling, dental abscesses or gum lesions. Pharynx: Oropharynx is clear. Uvula midline. Eyes:      Conjunctiva/sclera: Conjunctivae normal.      Pupils: Pupils are equal, round, and reactive to light. Neck:      Musculoskeletal: Normal range of motion and neck supple. Cardiovascular:      Rate and Rhythm: Normal rate and regular rhythm. Pulmonary:      Effort: Pulmonary effort is normal. No respiratory distress. Breath sounds: Normal breath sounds. Abdominal:      Palpations: Abdomen is soft. Musculoskeletal: Normal range of motion. Skin:     General: Skin is warm. Findings: No rash. Neurological:      Mental Status: She is alert and oriented to person, place, and time. Cranial Nerves: No cranial nerve deficit. Coordination: Coordination normal.   Psychiatric:         Behavior: Behavior normal.           Diagnostic Study Results     Labs -   No results found for this or any previous visit (from the past 12 hour(s)).     Radiologic Studies -   No orders to display     CT Results  (Last 48 hours) None        CXR Results  (Last 48 hours)    None            Medical Decision Making   I am the first provider for this patient. I reviewed the vital signs, available nursing notes, past medical history, past surgical history, family history and social history. Vital Signs-Reviewed the patient's vital signs. Records Reviewed: Nursing notes, old medical records and any previous labs, imaging, visits, consultations pertinent to patient care    Procedures:  Procedures    ED Course: Progress Notes, Reevaluation, and Consults:      Provider Notes (Medical Decision Making):   Pt presents ambulatory in NAD, well-hydrated, non-toxic in appearance, with normal vitals. Exam reveals diffusely poor dentition without localized induration or fluctuance to suggest drainable abscess. No sublingual/submandibular induration, trismus, or stridor. Normal speech. Handling oral secretions without difficulty. Pt with full ROM of neck. Low suspicion for Neel's angina or deep space infection. Will DC home with amoxicillin, analgesia. Pt is strongly advised to follow-up with dentist in short period of time as they will need definitive management. MED RECONCILIATION:  No current facility-administered medications for this encounter. Current Outpatient Medications   Medication Sig    butalbital-acetaminophen-caff (Fioricet) -40 mg per capsule Take 1 Cap by mouth every four (4) hours as needed (Take 1-2 tablets ever 4 hours as need for pain.  Not to exceed 6 tablets in a 24 hour period. ).  ondansetron hcl (Zofran) 4 mg tablet Take 2 Tabs by mouth every eight (8) hours as needed for Nausea.  ferrous sulfate 325 mg (65 mg iron) tablet Take 1 Tab by mouth two (2) times a day. Disposition:  home    DISCHARGE NOTE:     Pt has been reexamined. Patient has no new complaints, changes, or physical findings. Care plan outlined and precautions discussed.   Results of work up, treatment plan and disposition were reviewed with the patient. All medications were reviewed with the patient; including any prescriptions given. All of pt's questions and concerns were addressed. Patient was instructed and agrees to follow up with pcp , as well as to return to the ED upon further deterioration. Patient is ready to go home.     Follow-up Information    None         Current Discharge Medication List              Diagnosis     Clinical Impression: dentalgia

## 2020-08-21 ENCOUNTER — APPOINTMENT (OUTPATIENT)
Dept: GENERAL RADIOLOGY | Age: 40
End: 2020-08-21
Attending: PHYSICIAN ASSISTANT
Payer: MEDICAID

## 2020-08-21 ENCOUNTER — HOSPITAL ENCOUNTER (EMERGENCY)
Age: 40
Discharge: HOME | End: 2020-08-21
Attending: EMERGENCY MEDICINE
Payer: MEDICAID

## 2020-08-21 VITALS
TEMPERATURE: 99.1 F | RESPIRATION RATE: 16 BRPM | HEIGHT: 66 IN | OXYGEN SATURATION: 99 % | BODY MASS INDEX: 33.75 KG/M2 | SYSTOLIC BLOOD PRESSURE: 144 MMHG | HEART RATE: 84 BPM | WEIGHT: 210 LBS | DIASTOLIC BLOOD PRESSURE: 98 MMHG
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
## 2020-08-21 DIAGNOSIS — S20.219A CONTUSION OF CHEST WALL, UNSPECIFIED LATERALITY, INITIAL ENCOUNTER: ICD-10-CM

## 2020-08-21 LAB
ATRIAL RATE: 70 BPM
CALCULATED P AXIS, ECG09: 68 DEGREES
CALCULATED R AXIS, ECG10: 36 DEGREES
CALCULATED T AXIS, ECG11: 40 DEGREES
DIAGNOSIS, 93000: NORMAL
P-R INTERVAL, ECG05: 172 MS
Q-T INTERVAL, ECG07: 388 MS
QRS DURATION, ECG06: 86 MS
QTC CALCULATION (BEZET), ECG08: 419 MS
VENTRICULAR RATE, ECG03: 70 BPM

## 2020-08-21 PROCEDURE — 93005 ELECTROCARDIOGRAM TRACING: CPT

## 2020-08-21 PROCEDURE — 71045 X-RAY EXAM CHEST 1 VIEW: CPT

## 2020-08-21 PROCEDURE — 99284 EMERGENCY DEPT VISIT MOD MDM: CPT

## 2020-08-21 RX ORDER — NAPROXEN 500 MG/1
500 TABLET ORAL 2 TIMES DAILY WITH MEALS
Qty: 20 TAB | Refills: 0 | Status: SHIPPED | OUTPATIENT
Start: 2020-08-21 | End: 2020-11-23

## 2020-08-21 RX ORDER — CYCLOBENZAPRINE HCL 5 MG
10 TABLET ORAL
Qty: 9 TAB | Refills: 0 | Status: SHIPPED | OUTPATIENT
Start: 2020-08-21 | End: 2020-11-23

## 2020-08-21 NOTE — ED NOTES
I have reviewed discharge instructions with the {PATIENT PARENT GUARDIAN:[id number]}. The {PATIENT PARENT GUARDIAN:[id number]} verbalized understanding.
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
## 2020-10-09 ENCOUNTER — HOSPITAL ENCOUNTER (OUTPATIENT)
Dept: LAB | Age: 40
Discharge: HOME | End: 2020-10-09

## 2020-10-09 ENCOUNTER — TRANSCRIBE ORDER (OUTPATIENT)
Dept: REGISTRATION | Age: 40
End: 2020-10-09

## 2020-10-09 ENCOUNTER — HOSPITAL ENCOUNTER (OUTPATIENT)
Dept: GENERAL RADIOLOGY | Age: 40
Discharge: HOME | End: 2020-10-09
Payer: MEDICAID

## 2020-10-09 DIAGNOSIS — D50.0 IRON DEFICIENCY ANEMIA SECONDARY TO BLOOD LOSS (CHRONIC): Primary | ICD-10-CM

## 2020-10-09 DIAGNOSIS — D50.0 IRON DEFICIENCY ANEMIA SECONDARY TO BLOOD LOSS (CHRONIC): ICD-10-CM

## 2020-10-09 LAB — SENTARA SPECIMEN COL,SENBCF: NORMAL

## 2020-10-09 PROCEDURE — 72110 X-RAY EXAM L-2 SPINE 4/>VWS: CPT

## 2020-10-09 PROCEDURE — 99001 SPECIMEN HANDLING PT-LAB: CPT

## 2020-11-23 ENCOUNTER — OFFICE VISIT (OUTPATIENT)
Dept: ORTHOPEDIC SURGERY | Age: 40
End: 2020-11-23
Payer: MEDICAID

## 2020-11-23 VITALS
BODY MASS INDEX: 35.02 KG/M2 | SYSTOLIC BLOOD PRESSURE: 135 MMHG | RESPIRATION RATE: 16 BRPM | DIASTOLIC BLOOD PRESSURE: 80 MMHG | HEART RATE: 89 BPM | TEMPERATURE: 97.7 F | WEIGHT: 217 LBS

## 2020-11-23 DIAGNOSIS — R20.2 PARESTHESIA OF BOTH LOWER EXTREMITIES: ICD-10-CM

## 2020-11-23 DIAGNOSIS — M47.816 LUMBAR SPONDYLOSIS: Primary | ICD-10-CM

## 2020-11-23 PROBLEM — E66.01 SEVERE OBESITY (HCC): Status: ACTIVE | Noted: 2020-11-23

## 2020-11-23 PROCEDURE — 99203 OFFICE O/P NEW LOW 30 MIN: CPT | Performed by: PHYSICAL MEDICINE & REHABILITATION

## 2020-11-23 RX ORDER — PREGABALIN 75 MG/1
75 CAPSULE ORAL
Qty: 60 CAP | Refills: 0 | Status: SHIPPED | OUTPATIENT
Start: 2020-11-23 | End: 2020-11-24 | Stop reason: SDUPTHER
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
## 2020-11-23 NOTE — LETTER
11/23/2020 2:05 PM    Ms. Ramonita Steward [id number]      To Whom It May Concern:    Ramonita Schmitt is currently under the care of [facility]. She was evaluated today. I would recommend light duty if available. No repetitive bending, twisting, lifting. No lifting greater than 20 lbs. If there are questions or concerns please have the patient contact our office.         Sincerely,          Deisy Swanson MD

## 2020-11-23 NOTE — PATIENT INSTRUCTIONS
MRI of the Lumbar Spine: About This Test 
What is it? MRI (magnetic resonance imaging) is a test that uses a magnetic field and pulses of radio wave energy to make pictures of the organs and structures inside the body. An MRI can give your doctor information about the spine in your lower back (the lumbar spine). This can include the spine, the space around the spinal cord, and the vertebrae in your lower back. When you have an MRI, you lie on a table that moves into the MRI machine. Why is this test done? An MRI of the lumbar spine can help find the cause of symptoms like back pain or leg pain, weakness, or numbness. It can help find problems such as a herniated disc, a tumor, or an infection. How do you prepare for the test? 
In general, there's nothing you have to do before this test, unless your doctor tells you to. Tell your doctor if you get nervous in tight spaces. You may get a medicine to help you relax. If you think you'll get this medicine, be sure you have someone to take you home. How is the test done? · You may have contrast material (dye) put into your arm through a tube called an IV. · You will lie on a table that's part of the MRI scanner. · The table will slide into the space that contains the magnet. · Inside the scanner, you will hear a fan and feel air moving. You may hear tapping, thumping, or snapping noises. You may be given earplugs or headphones to reduce the noise. · You will be asked to hold still during the scan. You may be asked to hold your breath for short periods. · You may be alone in the scanning room. But a technologist will watch through a window and talk with you during the test. 
How does having an MRI of the spine feel? You won't have pain from the magnetic field or radio waves used for the MRI test. You may be tired or sore from lying in one position for a long time. If a contrast material is used, you may feel some coolness when it is put into your IV. In rare cases, you may feel: · Tingling in the mouth if you have metal dental fillings. · Warmth in the area being checked. This is normal. Tell the technologist if you have nausea, vomiting, a headache, dizziness, pain, burning, or breathing problems. How long does the test take? The test usually takes 30 to 60 minutes but can take as long as 2 hours. What are the risks of an MRI of the spine? There are no known harmful effects from the strong magnetic field used for an MRI. But the magnet is very powerful. It may affect any metal implants or other medical devices you have. Risks from contrast material 
Contrast material that contains gadolinium may be used in this test. But for most people, the benefit of its use in this test outweighs the risk. Be sure to tell your doctor if you have kidney problems or are pregnant. There is a slight chance of an allergic reaction if contrast material is used during the test. But most reactions are mild and can be treated using medicine. If you breastfeed and are concerned about whether the contrast material used in this test is safe, talk to your doctor. Most experts believe that very little dye passes into breast milk and even less is passed on to the baby. But if you are concerned, you can stop breastfeeding for up to 24 hours after the test. During this time, you can give your baby breast milk that you stored before the test. Don't use the breast milk you pump in the 24 hours after the test. Throw it out. What happens after the test? 
· You will probably be able to go home right away. It depends on the reason for the test. 
· You can go back to your usual activities right away. Follow-up care is a key part of your treatment and safety. Be sure to make and go to all appointments, and call your doctor if you are having problems. It's also a good idea to keep a list of the medicines you take. Ask your doctor when you can expect to have your test results. Where can you learn more? Go to http://www.gray.com/ Enter N800 in the search box to learn more about \"MRI of the Lumbar Spine: About This Test.\" Current as of: December 9, 2019               Content Version: 12.6 © 8168-3854 [employer]. Care instructions adapted under license by [employer] (which disclaims liability or warranty for this information). If you have questions about a medical condition or this instruction, always ask your healthcare professional. [address] any warranty or liability for your use of this information.

## 2020-11-24 RX ORDER — PREGABALIN 75 MG/1
CAPSULE ORAL
Qty: 60 CAP | Refills: 0 | Status: SHIPPED | OUTPATIENT
Start: 2020-11-24 | End: 2021-01-14 | Stop reason: SDUPTHER

## 2020-12-08 ENCOUNTER — HOSPITAL ENCOUNTER (OUTPATIENT)
Dept: PHYSICAL THERAPY | Age: 40
End: 2020-12-08

## 2020-12-14 ENCOUNTER — TRANSCRIBE ORDER (OUTPATIENT)
Dept: SCHEDULING | Age: 40
End: 2020-12-14

## 2020-12-14 DIAGNOSIS — N92.0 MENORRHAGIA: Primary | ICD-10-CM

## 2020-12-15 ENCOUNTER — APPOINTMENT (OUTPATIENT)
Dept: PHYSICAL THERAPY | Age: 40
End: 2020-12-15

## 2020-12-18 ENCOUNTER — HOSPITAL ENCOUNTER (OUTPATIENT)
Dept: ULTRASOUND IMAGING | Age: 40
Discharge: HOME | End: 2020-12-18
Attending: OBSTETRICS & GYNECOLOGY
Payer: MEDICAID

## 2020-12-18 ENCOUNTER — APPOINTMENT (OUTPATIENT)
Dept: ULTRASOUND IMAGING | Age: 40
End: 2020-12-18
Attending: OBSTETRICS & GYNECOLOGY
Payer: MEDICAID

## 2020-12-18 ENCOUNTER — HOSPITAL ENCOUNTER (OUTPATIENT)
Dept: MRI IMAGING | Age: 40
Discharge: HOME | End: 2020-12-18
Attending: PHYSICAL MEDICINE & REHABILITATION
Payer: MEDICAID

## 2020-12-18 DIAGNOSIS — M47.816 LUMBAR SPONDYLOSIS: ICD-10-CM

## 2020-12-18 DIAGNOSIS — N92.0 MENORRHAGIA: ICD-10-CM

## 2020-12-18 DIAGNOSIS — R20.2 PARESTHESIA OF BOTH LOWER EXTREMITIES: ICD-10-CM

## 2020-12-18 PROCEDURE — 76856 US EXAM PELVIC COMPLETE: CPT

## 2020-12-29 ENCOUNTER — HOSPITAL ENCOUNTER (OUTPATIENT)
Dept: PHYSICAL THERAPY | Age: 40
Discharge: HOME | End: 2020-12-29

## 2020-12-29 NOTE — PROGRESS NOTES
PT DAILY TREATMENT NOTE - [facility]  Patient Name: Joelle Ahmadi Date:2020 : 1980 [x]  Patient  Verified Payor: Donna Turk / Plan: Woody Reasoner / Product Type: Managed Care Medicaid / In time:***  Out time:*** Total Treatment Time (min): *** Total Timed Codes (min): *** 
1:1 Treatment Time ([address] only): *** Visit #: *** of *** Treatment Area: Low back pain [M54.5] Spondylosis without myelopathy or radiculopathy, lumbar region [M47.816] Paresthesia of skin [R20.2] SUBJECTIVE Pain Level (0-10 scale): *** Any medication changes, allergies to medications, adverse drug reactions, diagnosis change, or new procedure performed?: [x] No    [] Yes (see summary sheet for update) Subjective functional status/changes:   [] No changes reported *** OBJECTIVE Modality rationale: {BSI INMOTION MODALITIES:40036} to improve the patients ability to *** Min Type Additional Details  
 [] Estim:  []Unatt       []IFC  []Premod []Other:  []w/ice   []w/heat Position: Location:  
 [] Estim: []Att    []TENS instruct  []NMES []Other:  []w/US   []w/ice   []w/heat Position: Location:  
 []  Traction: [] Cervical       []Lumbar 
                     [] Prone          []Supine []Intermittent   []Continuous Lbs: 
[] before manual 
[] after manual  
 []  Ultrasound: []Continuous   [] Pulsed []1MHz   []3MHz W/cm2: 
Location:  
 []  Iontophoresis with dexamethasone Location: [] Take home patch  
[] In clinic  
 []  Ice     []  heat 
[]  Ice massage 
[]  Laser  
[]  Anodyne Position: Location:  
 []  Laser with stim 
[]  Other:  Position: Location:  
 []  Vasopneumatic Device Pressure:       [] lo [] med [] hi  
Temperature: [] lo [] med [] hi  
[] Skin assessment post-treatment:  []intact []redness- no adverse reaction 
  []redness  adverse reaction: *** min -Eval                  -Re-Eval  
 
 
*** min Therapeutic Exercise:  [] See flow sheet :  
Rationale: {BSHSI IMMOTION THER EX:03366} to improve the patients ability to *** 
 
*** min Therapeutic Activity:  []  See flow sheet :  
Rationale: {BSHSI IMMOTION THER EX:54919}  to improve the patients ability to *** 
  
*** min Neuromuscular Re-education:  []  See flow sheet :  
Rationale: {BSHSI IMMOTION THER EX:95733}  to improve the patients ability to *** 
 
*** min Manual Therapy:  *** Rationale: {BSHSI IMMOTION MANUAL THERAPY:11324} to *** 
 
*** min Gait Training:  ___ feet with ___ device on level surfaces with ___ level of assist  
Rationale: With 
 [] TE 
 [] TA 
 [] neuro 
 [] other: Patient Education: [x] Review HEP [] Progressed/Changed HEP based on:  
[] positioning   [] body mechanics   [] transfers   [] heat/ice application   
[] other:   
 
Other Objective/Functional Measures: *** Pain Level (0-10 scale) post treatment: *** ASSESSMENT/Changes in Function: *** Patient will continue to benefit from skilled PT services to {[facility] ASSESS[facility]:20159} to attain remaining goals. []  See Plan of Care 
[]  See progress note/recertification 
[]  See Discharge Summary Progress towards goals / Updated goals: 
*** PLAN 
[]  Upgrade activities as tolerated     []  Continue plan of care 
[]  Update interventions per flow sheet      
[]  Discharge due to:_ 
[]  Other:_ Nalini Santa PT 12/29/2020  10:35 AM 
 
Future Appointments Date Time Provider Vinita Vergara 12/29/2020  3:00 PM Alfreda Jefferson, PT [facility]  
1/6/2021 12:30 PM [facility] MRI RM 1 [facility]  
1/11/2021 10:30 AM Do, Renaee Cushing, MD [facility] BS [facility]  
1/14/2021 10:00 AM Fely Allen MD [facility] BS AMB

## 2021-01-07 ENCOUNTER — HOSPITAL ENCOUNTER (OUTPATIENT)
Dept: PHYSICAL THERAPY | Age: 41
Discharge: HOME | End: 2021-01-07
Payer: MEDICAID

## 2021-01-07 PROCEDURE — 97162 PT EVAL MOD COMPLEX 30 MIN: CPT

## 2021-01-07 NOTE — PROGRESS NOTES
PT DAILY TREATMENT NOTE  10-18    Patient Name: Javier Snyder  Date:2021  : 1980  [x]  Patient  Verified  Payor: Vikram Osiris / Plan: [id number] [address] / Product Type: Managed Care Medicaid /    In time: 1:35  Out time: 2:17  Total Treatment Time (min): 42  Visit #: 1 of 10    Treatment Area: Low back pain [M54.5]  Spondylosis without myelopathy or radiculopathy, lumbar region [M47.816]  Paresthesia of skin [R20.2]    SUBJECTIVE  Pain Level (0-10 scale): 9  Any medication changes, allergies to medications, adverse drug reactions, diagnosis change, or new procedure performed?: [x] No    [] Yes (see summary sheet for update)  Subjective functional status/changes:   [] No changes reported  See POC    OBJECTIVE    22 min [x]Eval                  []Re-Eval     10 min Therapeutic Exercise:  [] See flow sheet : HEP instruction and demonstration   Rationale: increase ROM and increase strength to improve the patients ability to tolerate ADLs    10 min Self Care/Home Management: []  See flow sheet : pt education regarding anatomy and physiology of the spine and how it relates to the pt's condition, pt education on monitoring her bowel/bladder and to go to the ED if she experiences worsening of these symptoms or notices saddle paraesthesias    Rationale: increase ROM, increase strength and decrease pain/symptoms  to improve the patients ability to tolerate functional tasks. With   [x] TE   [] TA   [] neuro   [x] Other: Self Care/Home management Patient Education: [x] Review HEP    [] Progressed/Changed HEP based on:   [] positioning   [] body mechanics   [] transfers   [] heat/ice application    [] other:      Other Objective/Functional Measures: See evaluation. Pain Level (0-10 scale) post treatment: 9    ASSESSMENT/Changes in Function: Pt given HEP handout to perform. Pt understood exercises in HEP handout.  Pt demonstrated decreased AROM, decreased strength, muscle tightness, and tenderness to palpation. 2+ B achilles reflexes, 1+ B patellar reflexes. Global left LE weakness noted with MMT with no myotomal pattern. Pt would benefit from physical therapy to improve the above impairments to help the pt return to performing ADLs, functional and recreational activities. Patient will continue to benefit from skilled PT services to modify and progress therapeutic interventions, address functional mobility deficits, address ROM deficits, address strength deficits, analyze and address soft tissue restrictions, analyze and cue movement patterns, analyze and modify body mechanics/ergonomics, assess and modify postural abnormalities, address imbalance/dizziness and instruct in home and community integration to attain remaining goals. [x]  See Plan of Care  []  See progress note/recertification  []  See Discharge Summary         Progress towards goals / Updated goals:  Short Term Goals: To be accomplished in 2 treatments:  1. Pt will report compliance and independence to HEP to help the pt manage their pain and symptoms. Eval: established   Long Term Goals: To be accomplished in 10 treatments:  1. Pt will increase FOTO score to 61 points to improve ability to perform ADLs. Eval: 56 points  2. Pt will increase MMT left hip flex to 4+/5, left hip IR/ER to 4/5 to improve ability to tolerate household chores. Eval: left hip flex 4/5, left hip IR/ER 4-/5  3. Pt will increase AROM l/s EXT to % of WNL with mild to no increased LBP to improve ease of sleeping. Eval: 50-75% of WNL with LBP  4. Pt will report being able to sit/ambulate for 10 mins with minimal to no increased LBP to improve tolerance to community/household activities. Eval: constant changing of positions noted in sitting on plinth today during the evaluation, reports having increased LBP with ambulation.      PLAN  [x]  Upgrade activities as tolerated     [x]  Continue plan of care  [x]  Update interventions per flow sheet       []  Discharge due to:_  []  Other:_      Chris elton, PT 1/7/2021  2:59 PM    Future Appointments   Date Time Provider Vinita Littlei   1/8/2021 11:30 AM [facility] MRI RM 1 [facility]   1/11/2021 10:30 AM Negar Burns MD [facility] BS AMB   1/13/2021 10:30 AM Helena Brash [facility]   1/14/2021 10:00 AM Samantha Carpio MD [facility]   1/15/2021  9:00 AM Joseluis Napoles, PT [facility]   1/19/2021 10:30 AM Cailin Low [facility]   1/21/2021 11:15 AM Cailin Low [facility]   1/25/2021 10:30 AM [facility] Brash [facility]   1/28/2021 12:00 PM [facility]ailin Low [facility]   2/2/2021 10:30 AM [facility]in Low [facility]   2/4/2021 11:15 AM Cailin Low [facility]   2/9/2021 10:30 AM [facility]in Low [facility]   2/11/2021 10:30 AM Cailin Low [facility]
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
## 2021-01-08 ENCOUNTER — HOSPITAL ENCOUNTER (OUTPATIENT)
Dept: MRI IMAGING | Age: 41
Discharge: HOME | End: 2021-01-08
Attending: PHYSICAL MEDICINE & REHABILITATION
Payer: MEDICAID

## 2021-01-08 PROCEDURE — 72148 MRI LUMBAR SPINE W/O DYE: CPT

## 2021-01-13 ENCOUNTER — HOSPITAL ENCOUNTER (OUTPATIENT)
Dept: PHYSICAL THERAPY | Age: 41
Discharge: HOME | End: 2021-01-13
Payer: MEDICAID

## 2021-01-13 PROCEDURE — 97530 THERAPEUTIC ACTIVITIES: CPT

## 2021-01-13 PROCEDURE — 97112 NEUROMUSCULAR REEDUCATION: CPT

## 2021-01-13 PROCEDURE — 97110 THERAPEUTIC EXERCISES: CPT

## 2021-01-13 NOTE — PROGRESS NOTES
PT DAILY TREATMENT NOTE     Patient Name: Joslyn Body  Date:2021  : 1980  [x]  Patient  Verified  Payor: Hazel Cuello / Plan: [id number] / Product Type: Managed Care Medicaid /    In time: 10:30  Out time: 11:29  Total Treatment Time (min): 59  Visit #: 2 of 10    Treatment Area: Low back pain [M54.5]  Spondylosis without myelopathy or radiculopathy, lumbar region [M47.816]  Paresthesia of skin [R20.2]    SUBJECTIVE  Pain Level (0-10 scale): 7/10  Any medication changes, allergies to medications, adverse drug reactions, diagnosis change, or new procedure performed?: [x] No    [] Yes (see summary sheet for update)  Subjective functional status/changes:   [] No changes reported  Pt reports pain locates in the middle of her back.      OBJECTIVE    Modality rationale: decrease pain and increase tissue extensibility to improve the patients ability to tolerate ADLs   Min Type Additional Details    [] Estim:  []Unatt       []IFC  []Premod                        []Other:  []w/ice   []w/heat  Position:  Location:    [] Estim: []Att    []TENS instruct  []NMES                    []Other:  []w/US   []w/ice   []w/heat  Position:  Location:    []  Traction: [] Cervical       []Lumbar                       [] Prone          []Supine                       []Intermittent   []Continuous Lbs:  [] before manual  [] after manual    []  Ultrasound: []Continuous   [] Pulsed                           []1MHz   []3MHz W/cm2:  Location:    []  Iontophoresis with dexamethasone         Location: [] Take home patch   [] In clinic   10 []  Ice     [x]  heat  []  Ice massage  []  Laser   []  Anodyne Position: seated  Location: back    []  Laser with stim  []  Other:  Position:  Location:    []  Vasopneumatic Device Pressure:       [] lo [] med [] hi   Temperature: [] lo [] med [] hi   [x] Skin assessment post-treatment:  [x]intact []redness- no adverse reaction    []redness  adverse reaction:     24 min Therapeutic Exercise:  [x] See flow sheet :   Rationale: increase ROM and increase strength to improve the patients ability to amb and perform ADls with ease    10 min Therapeutic Activity:  [x]  See flow sheet :posture education, bed mobs, pain management with modalities; alignment correction with self MET   Rationale: increase strength, improve coordination, improve balance and increase proprioception  to improve the patients ability to transfer and perform ADLs safely     15 min Neuromuscular Re-education:  [x]  See flow sheet :   Rationale: increase strength, improve coordination, improve balance and increase proprioception  to improve the patients ability to amb and perform ADLs with ease          With   [] TE   [] TA   [] neuro   [] other: Patient Education: [x] Review HEP    [] Progressed/Changed HEP based on:   [] positioning   [] body mechanics   [] transfers   [] heat/ice application    [] other:      Other Objective/Functional Measures: Mod pain with lifting her legs onto SB   Right up slip, Right AI, right on right sacral rotation; min improved pain after correction   Poor hip flexors and quad flexibility, worst with Right side   Good form with therex after education     Pain Level (0-10 scale) post treatment: 0/10    ASSESSMENT/Changes in Function: Initiated treatment as POC, pt demonstrated fair motivation motivation and tolerance. She present with pelvic obliquity today, min pain improvement with correction. Most pleased with moist heat. Will cont to progress therex as tolerated. Will educate and give instruction for self correction of pelvic alignment as needed.      Patient will continue to benefit from skilled PT services to modify and progress therapeutic interventions, address functional mobility deficits, address ROM deficits, address strength deficits, analyze and address soft tissue restrictions, analyze and cue movement patterns, analyze and modify body mechanics/ergonomics, assess and modify postural abnormalities, address imbalance/dizziness and instruct in home and community integration to attain remaining goals. [x]  See Plan of Care  []  See progress note/recertification  []  See Discharge Summary         Progress towards goals / Updated goals:  Short Term Goals: To be accomplished in 2 treatments:  1. Pt will report compliance and independence to HEP to help the pt manage their pain and symptoms. Eval: established   Long Term Goals: To be accomplished in 10 treatments:  1. Pt will increase FOTO score to 61 points to improve ability to perform ADLs. Eval: 56 points  2. Pt will increase MMT left hip flex to 4+/5, left hip IR/ER to 4/5 to improve ability to tolerate household chores. Eval: left hip flex 4/5, left hip IR/ER 4-/5  3. Pt will increase AROM l/s EXT to % of WNL with mild to no increased LBP to improve ease of sleeping. Eval: 50-75% of WNL with LBP  4. Pt will report being able to sit/ambulate for 10 mins with minimal to no increased LBP to improve tolerance to community/household activities. Eval: constant changing of positions noted in sitting on plinth today during the evaluation, reports having increased LBP with ambulation.      PLAN  [x]  Upgrade activities as tolerated     [x]  Continue plan of care  []  Update interventions per flow sheet       []  Discharge due to:_  []  Other:_      Richie Ag, PT 1/13/2021  8:03 AM    Future Appointments   Date Time Provider Vinita Demarcoisti   1/13/2021 10:30 AM Manolo Talley [facility]ZTSI [facility]   1/14/2021 10:00 AM Wisam Mireles MD [facility] BS AMB   1/15/2021  9:00 AM Joon Freedman, PTA [facility]CPTP[facility]   1/19/2021 10:30 AM Natanael Portillo PTA [facility]   1/21/2021  9:45 AM Jose Angel Burns MD [facility] BS AMB   1/21/2021 11:15 AM Venancio Peterson [facility]   1/25/2021 10:30 AM Manolo Talley [facility]   1/28/2021 12:00 PM Venancio Peterson [facility]   2/2/2021 10:30 AM Venancio LYNN [facility]   2/4/2021 11:15 AM Samanthaase Wyatt VQMDSNC [facility]   2/9/2021 10:30 AM Samanthaase Wyatt CJOPCPM [facility]   2/11/2021 10:30 AM Samanthaase Wyatt MMCPTP[facility]

## 2021-01-14 ENCOUNTER — OFFICE VISIT (OUTPATIENT)
Dept: ORTHOPEDIC SURGERY | Age: 41
End: 2021-01-14
Payer: MEDICAID

## 2021-01-14 VITALS
DIASTOLIC BLOOD PRESSURE: 81 MMHG | TEMPERATURE: 97.4 F | SYSTOLIC BLOOD PRESSURE: 120 MMHG | RESPIRATION RATE: 18 BRPM | HEART RATE: 72 BPM | BODY MASS INDEX: 34.22 KG/M2 | WEIGHT: 212 LBS

## 2021-01-14 DIAGNOSIS — M47.816 LUMBAR SPONDYLOSIS: ICD-10-CM

## 2021-01-14 PROCEDURE — 99213 OFFICE O/P EST LOW 20 MIN: CPT | Performed by: PHYSICAL MEDICINE & REHABILITATION

## 2021-01-14 RX ORDER — PREGABALIN 75 MG/1
75 CAPSULE ORAL 2 TIMES DAILY
Qty: 180 CAP | Refills: 1 | Status: SHIPPED | OUTPATIENT
Start: 2021-01-14

## 2021-01-14 NOTE — PATIENT INSTRUCTIONS
Low Back Arthritis: Exercises Introduction Here are some examples of typical rehabilitation exercises for your condition. Start each exercise slowly. Ease off the exercise if you start to have pain. Your doctor or physical therapist will tell you when you can start these exercises and which ones will work best for you. When you are not being active, find a comfortable position for rest. Some people are comfortable on the floor or a medium-firm bed with a small pillow under their head and another under their knees. Some people prefer to lie on their side with a pillow between their knees. Don't stay in one position for too long. Take short walks (10 to 20 minutes) every 2 to 3 hours. Avoid slopes, hills, and stairs until you feel better. Walk only distances you can manage without pain, especially leg pain. How to do the exercises Pelvic tilt 1. Lie on your back with your knees bent. 2. \"Brace\" your stomachtighten your muscles by pulling in and imagining your belly button moving toward your spine. 3. Press your lower back into the floor. You should feel your hips and pelvis rock back. 4. Hold for 6 seconds while breathing smoothly. 5. Relax and allow your pelvis and hips to rock forward. 6. Repeat 8 to 12 times. Back stretches 1. Get down on your hands and knees on the floor. 2. Relax your head and allow it to droop. Round your back up toward the ceiling until you feel a nice stretch in your upper, middle, and lower back. Hold this stretch for as long as it feels comfortable, or about 15 to 30 seconds. 3. Return to the starting position with a flat back while you are on your hands and knees. 4. Let your back sway by pressing your stomach toward the floor. Lift your buttocks toward the ceiling. 5. Hold this position for 15 to 30 seconds. 6. Repeat 2 to 4 times. Follow-up care is a key part of your treatment and safety. Be sure to make and go to all appointments, and call your doctor if you are having problems. It's also a good idea to know your test results and keep a list of the medicines you take. Where can you learn more? Go to http://www.gray.com/ Enter Z581 in the search box to learn more about \"Low Back Arthritis: Exercises. \" Current as of: March 2, 2020               Content Version: 12.6 © 2006-2020 [employer]. Care instructions adapted under license by [employer] (which disclaims liability or warranty for this information). If you have questions about a medical condition or this instruction, always ask your healthcare professional. [address] any warranty or liability for your use of this information.

## 2021-01-14 NOTE — PROGRESS NOTES
Jimena Hutchins presents today for No chief complaint on file. Is someone accompanying this pt? NO    Is the patient using any DME equipment during OV? NO    Depression Screening:  3 most recent PHQ Screens 1/14/2021   Little interest or pleasure in doing things Not at all   Feeling down, depressed, irritable, or hopeless Not at all   Total Score PHQ 2 0       Coordination of Care:  1. Have you been to the ER, urgent care clinic since your last visit? NO  Hospitalized since your last visit? NO    2. Have you seen or consulted any other health care providers outside of the [address] [place] [address] since your last visit? NO Include any pap smears or colon screening.  NO    Last  Checked 1/14/2021
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
## 2021-01-15 ENCOUNTER — APPOINTMENT (OUTPATIENT)
Dept: PHYSICAL THERAPY | Age: 41
End: 2021-01-15
Payer: MEDICAID

## 2021-01-19 ENCOUNTER — DOCUMENTATION ONLY (OUTPATIENT)
Dept: ORTHOPEDIC SURGERY | Age: 41
End: 2021-01-19

## 2021-01-21 ENCOUNTER — HOSPITAL ENCOUNTER (OUTPATIENT)
Dept: PHYSICAL THERAPY | Age: 41
Discharge: HOME | End: 2021-01-21
Payer: MEDICAID

## 2021-01-21 ENCOUNTER — OFFICE VISIT (OUTPATIENT)
Dept: ONCOLOGY | Age: 41
End: 2021-01-21
Payer: MEDICAID

## 2021-01-21 VITALS
RESPIRATION RATE: 18 BRPM | HEIGHT: 66 IN | OXYGEN SATURATION: 100 % | DIASTOLIC BLOOD PRESSURE: 82 MMHG | WEIGHT: 211 LBS | HEART RATE: 68 BPM | BODY MASS INDEX: 33.91 KG/M2 | SYSTOLIC BLOOD PRESSURE: 129 MMHG

## 2021-01-21 DIAGNOSIS — D64.9 CHRONIC ANEMIA: ICD-10-CM

## 2021-01-21 DIAGNOSIS — D50.9 MICROCYTIC ANEMIA: Primary | ICD-10-CM

## 2021-01-21 PROCEDURE — 97112 NEUROMUSCULAR REEDUCATION: CPT

## 2021-01-21 PROCEDURE — 99204 OFFICE O/P NEW MOD 45 MIN: CPT | Performed by: INTERNAL MEDICINE

## 2021-01-21 PROCEDURE — 97014 ELECTRIC STIMULATION THERAPY: CPT

## 2021-01-21 PROCEDURE — 97110 THERAPEUTIC EXERCISES: CPT

## 2021-01-21 PROCEDURE — 97530 THERAPEUTIC ACTIVITIES: CPT

## 2021-01-21 RX ORDER — NALOXONE HYDROCHLORIDE 4 MG/.1ML
4 SPRAY NASAL
COMMUNITY
Start: 2020-11-17

## 2021-01-21 RX ORDER — PROMETHAZINE HYDROCHLORIDE 25 MG/1
25 TABLET ORAL
COMMUNITY
Start: 2020-10-19

## 2021-01-21 NOTE — PROGRESS NOTES
Hematology/Oncology Consultation Note      Date: 2021    Name: Elizabeth Brambila  : 1980        Unknown, Provider         Subjective:     Chief complaint: Anemia    History of Present Illness:   Ms. Jim Zavala is a most pleasant 45y.o. year old female who was seen for consultation of anemia. The patient has a past medical history significant of anemia and GERD.  2020 CBC reported hemoglobin was of 7.3, hematocrit was 24.1%. MCV was low at 58.8. Total WBC was 6.6, and platelet was 236,391. No new iron study/ferritin was available to review. Lab reviews indicated chronic microcytic anemia since at least 2010, hemoglobin was of 9. The patient reported chronic fatigue and ice craving. She has f/u GYN for chronic heavy menstrual periods, s.p tubal ligations b.l. The patient otherwise has no other complaints. Denied fever, chills, night sweat, unintentional weight loss, skin lumps or bumps, acute bleeding or bruising issues. No acute bleeding, blood in stool, dark stool, melena, hematochezia, hemoptysis, dark urine, or easily bruising. Denied headache, acute vision change, worsening dizziness, chest pain, worsen shortness of breath, palpitation, productive cough, nausea, vomiting, abdominal pain, altered bowel habits, dysuria, worsen bone pain or back pain, new focal numbness or weakness. Family History: Sisters have anemia. Oncologic History:  Oncology History    No history exists. Past Medical History, Family History, and Social History:    Past Medical History:   Diagnosis Date    Anemia     Chronic knee pain     GERD (gastroesophageal reflux disease)      No past surgical history on file.   Social History     Socioeconomic History    Marital status:      Spouse name: Not on file    Number of children: Not on file    Years of education: Not on file    Highest education level: Not on file   Occupational History    Not on file   Social Needs    Financial resource strain: Not on file    Food insecurity     Worry: Not on file     Inability: Not on file    Transportation needs     Medical: Not on file     Non-medical: Not on file   Tobacco Use    Smoking status: Never Smoker    Smokeless tobacco: Never Used   Substance and Sexual Activity    Alcohol use: Yes     Comment: socially, occasionally    Drug use: No    Sexual activity: Yes   Lifestyle    Physical activity     Days per week: Not on file     Minutes per session: Not on file    Stress: Not on file   Relationships    Social connections     Talks on phone: Not on file     Gets together: Not on file     Attends [employer] service: Not on file     Active member of club or organization: Not on file     Attends meetings of clubs or organizations: Not on file     Relationship status: Not on file    Intimate partner violence     Fear of current or ex partner: Not on file     Emotionally abused: Not on file     Physically abused: Not on file     Forced sexual activity: Not on file   Other Topics Concern    Not on file   Social History Narrative    Not on file     Family History   Problem Relation Age of Onset    Diabetes Maternal Grandmother      Current Outpatient Medications   Medication Sig Dispense Refill    naloxone (Narcan) 4 mg/actuation nasal spray 4 mg.  promethazine (PHENERGAN) 25 mg tablet Take 25 mg by mouth every six (6) hours as needed.  pregabalin (Lyrica) 75 mg capsule Take 1 Cap by mouth two (2) times a day. Max Daily Amount: 150 mg. 180 Cap 1    butalbital-acetaminophen-caff (Fioricet) -40 mg per capsule Take 1 Cap by mouth every four (4) hours as needed (Take 1-2 tablets ever 4 hours as need for pain.  Not to exceed 6 tablets in a 24 hour period. ). 6 Cap 0    ondansetron hcl (Zofran) 4 mg tablet Take 2 Tabs by mouth every eight (8) hours as needed for Nausea. 12 Tab 0    ferrous sulfate 325 mg (65 mg iron) tablet Take 1 Tab by mouth two (2) times a day.  60 Tab 1 Review of Systems   Constitutional: Positive for malaise/fatigue. Negative for chills, diaphoresis, fever and weight loss. Respiratory: Negative for cough, hemoptysis, shortness of breath and wheezing. Cardiovascular: Negative for chest pain, palpitations and leg swelling. Gastrointestinal: Negative for abdominal pain, diarrhea, heartburn, nausea and vomiting. Genitourinary: Negative for dysuria, frequency, hematuria and urgency. Musculoskeletal: Negative for joint pain and myalgias. Skin: Negative for itching and rash. Neurological: Negative for dizziness, seizures, weakness and headaches. Psychiatric/Behavioral: Negative for depression. The patient does not have insomnia. Objective:     Visit Vitals  /82   Pulse 68   Resp 18   Ht 5' 6\" (1.676 m)   Wt 95.7 kg (211 lb)   SpO2 100%   BMI 34.06 kg/m²       ECOG Performance Status (grade): 0  0 - able to carry on all pre-disease activity w/out restriction  1 - restricted but able to carry out light work  2 - ambulatory and can self- care but unable to carry out work  3 - bed or chair >50% of waking hours  4 - completely disable, total care, confined to bed or chair    Physical Exam  Constitutional:       Appearance: Normal appearance. HENT:      Head: Normocephalic and atraumatic. Eyes:      Pupils: Pupils are equal, round, and reactive to light. Neck:      Musculoskeletal: Neck supple. Cardiovascular:      Rate and Rhythm: Normal rate and regular rhythm. Heart sounds: Normal heart sounds. Pulmonary:      Effort: Pulmonary effort is normal.      Breath sounds: Normal breath sounds. Abdominal:      General: Bowel sounds are normal.      Palpations: Abdomen is soft. Tenderness: There is no abdominal tenderness. There is no guarding. Musculoskeletal: Normal range of motion. Right lower leg: No edema. Left lower leg: No edema. Skin:     General: Skin is warm.    Neurological:      General: No focal deficit present. Mental Status: She is alert and oriented to person, place, and time. Mental status is at baseline. Diagnostics:      No results found for this or any previous visit (from the past 96 hour(s)). Imaging:  No results found for this or any previous visit. Results for orders placed during the hospital encounter of 10/09/20   XR SPINE LUMB MIN 4 V    Narrative EXAM: XR SPINE LUMB MIN 4 V    INDICATION: Low back pain    COMPARISON: 4/27/2020    FINDINGS: AP, lateral and spot lateral views of the lumbar spine. 5 nonrib-bearing lumbar vertebrae. No pars defect. Straightening of the lumbar  lordosis without subluxation. Normal vertebral body heights. No fracture. Normal  disc space heights. Probably mild facet arthrosis L5-S1. Visualized SI joints  are maintained. Impression IMPRESSION:    1. No acute fracture or subluxation. 2. Straightening of the lumbar lordosis that could be due to muscular strain or  positional.  3. Probably L5-S1 mild facet arthrosis. No results found for this or any previous visit. Pathology          Assessment:                                        1. Microcytic anemia    2. Chronic anemia        Plan:                                        # Microcytic Anemia  # Chronic Anemia   -- The patient has a past medical history significant of anemia and GERD.  -- 7/27/2020 CBC reported hemoglobin was of 7.3, hematocrit was 24.1%. MCV was low at 58.8. Total WBC was 6.6, and platelet was 013,979. No new iron study/ferritin was available to review. -- Lab reviews indicated chronic microcytic anemia since at least 9/17/2010, hemoglobin was of 9.  -- The patient reported chronic fatigue and ice craving. She has f/u GYN for chronic heavy menstrual periods, s.p tubal ligations b.l. Strong family hx of anemia. -- Today I have reviewed with the patient about labs findings.   We are going to obtain her repeat CBC, chemistry, iron profiles, ferritin, and hemoglobin electrophoresis. Further workup will be guided by results from aforementioned initial study. -- I have discussed about the need of PRBC transfusion if indicated. Also discussed about the potential risk/ side effects of IV Iron replacement. -- I will see the patient back in clinic in about 3 weeks to review lab reports. Always sooner if required. Orders Placed This Encounter    METABOLIC PANEL, COMPREHENSIVE     Standing Status:   Future     Standing Expiration Date:   2022    IRON PROFILE     Standing Status:   Future     Standing Expiration Date:   2022    FERRITIN     Standing Status:   Future     Standing Expiration Date:   2022    RETICULOCYTE COUNT     Standing Status:   Future     Standing Expiration Date:   2022    HEMOGLOBIN FRACTIONATION     Standing Status:   Future     Standing Expiration Date:   2022    CBC WITH AUTOMATED DIFF     Standing Status:   Future     Standing Expiration Date:   2022    naloxone (Narcan) 4 mg/actuation nasal spray     Si mg.  promethazine (PHENERGAN) 25 mg tablet     Sig: Take 25 mg by mouth every six (6) hours as needed. Ms. Bryan Marlow has a reminder for a \"due or due soon\" health maintenance. I have asked that she contact her primary care provider for follow-up on this health maintenance. All of patient's questions answered to their apparent satisfaction. They verbally show understanding and agreement with aforementioned plan. Yesenia Valero MD  2021          About 45 minutes were spent for this encounter with more than 50% of the time spent in face-to-face counseling, discussing on diagnosis and management plan going forward, and co-ordination of care. Parts of this document has been produced using Dragon dictation system. Unrecognized errors in transcription may be present. Please do not hesitate to reach out for any questions or clarifications.

## 2021-01-21 NOTE — PROGRESS NOTES
PT DAILY TREATMENT NOTE     Patient Name: Merrill Butler  Date:2021  : 1980  [x]  Patient  Verified  Payor: Cy Hammans / Plan: [id number] / Product Type: Managed Care Medicaid /    In time: 11:25  Out time: 12:24  Total Treatment Time (min): 61  Visit #: 3 of 10    Treatment Area: Low back pain [M54.5]  Spondylosis without myelopathy or radiculopathy, lumbar region [M47.816]  Paresthesia of skin [R20.2]    SUBJECTIVE  Pain Level (0-10 scale): 7  Any medication changes, allergies to medications, adverse drug reactions, diagnosis change, or new procedure performed?: [x] No    [] Yes (see summary sheet for update)  Subjective functional status/changes:   [] No changes reported  \"I'm alright, it's the same thing, my back hurts. \"    OBJECTIVE    Modality rationale: decrease inflammation, decrease pain and increase tissue extensibility to improve the patients ability to  tolerate exercises and return to daily activity with ease.    Min Type Additional Details   15 [x] Estim:  [x]Unatt       [x]IFC  []Premod                        []Other:  []w/ice   [x]w/heat  Position: seated  Location: low back    [] Estim: []Att    []TENS instruct  []NMES                    []Other:  []w/US   []w/ice   []w/heat  Position:  Location:    []  Traction: [] Cervical       []Lumbar                       [] Prone          []Supine                       []Intermittent   []Continuous Lbs:  [] before manual  [] after manual    []  Ultrasound: []Continuous   [] Pulsed                           []1MHz   []3MHz W/cm2:  Location:    []  Iontophoresis with dexamethasone         Location: [] Take home patch   [] In clinic    []  Ice     []  heat  []  Ice massage  []  Laser   []  Anodyne Position:   Location:     []  Laser with stim  []  Other:  Position:  Location:    []  Vasopneumatic Device Pressure:       [] lo [] med [] hi   Temperature: [] lo [] med [] hi   [] Skin assessment post-treatment:  []intact []redness- no adverse reaction    []redness  adverse reaction:     15 min Therapeutic Exercise:  [x] See flow sheet :   Rationale: increase ROM and increase strength to improve the patients ability to perform ADLs with ease    9 min Therapeutic Activity:  []  See flow sheet :   Rationale: increase strength, improve coordination and increase proprioception  to improve the patients ability to  perform daily tasks and return to PLOF     20 min Neuromuscular Re-education:  [x]  See flow sheet :   Rationale: improve coordination, improve balance and increase proprioception  to improve the patients ability to stabilize, use proper body mechanics, and promote proper postural awareness          With   [x] TE   [x] TA   [x] neuro   [] other: Patient Education: [x] Review HEP    [] Progressed/Changed HEP based on:   [x] positioning   [x] body mechanics   [] transfers   [x] heat/ice application    [] other:      Other Objective/Functional Measures:   PPT requires verbal and tactile cuing for proper form; performed in seated and supine   Provided pt with hardcopy of DIY cold/hot pack instructions for pain relief at home. Pain Level (0-10 scale) post treatment: 5    ASSESSMENT/Changes in Function:  Pt demonstrates minimal dissociation between l/s and sacral spine/pelvis; increased tightness contributing to pain and decreased mobility. She reports difficulty with sitting and standing for any time extended beyond 5 min. Patient will benefit from core strengthening as well as increased l/s stretching exercises. Patient will continue to benefit from skilled PT services to modify and progress therapeutic interventions, address functional mobility deficits, address ROM deficits, address strength deficits, analyze and address soft tissue restrictions, analyze and cue movement patterns, analyze and modify body mechanics/ergonomics and assess and modify postural abnormalities to attain remaining goals.      [x]  See Plan of Care  []  See progress note/recertification  []  See Discharge Summary         Progress towards goals / Updated goals:  Short Term Goals: To be accomplished in 2 treatments:  1. Pt will report compliance and independence to HEP to help the pt manage their pain and symptoms. Eval: established   Long Term Goals: To be accomplished in 10 treatments:  1. Pt will increase FOTO score to 61 points to improve ability to perform ADLs. Eval: 56 points  2. Pt will increase MMT left hip flex to 4+/5, left hip IR/ER to 4/5 to improve ability to tolerate household chores. Eval: left hip flex 4/5, left hip IR/ER 4-/5  3. Pt will increase AROM l/s EXT to % of WNL with mild to no increased LBP to improve ease of sleeping. Eval: 50-75% of WNL with LBP  4. Pt will report being able to sit/ambulate for 10 mins with minimal to no increased LBP to improve tolerance to community/household activities. Eval: constant changing of positions noted in sitting on plinth today during the evaluation, reports having increased LBP with ambulation.      PLAN  [x]  Upgrade activities as tolerated     [x]  Continue plan of care  []  Update interventions per flow sheet       []  Discharge due to:_  []  Other:_      CHUCK Caraballo 1/21/2021  11:28 AM    Future Appointments   Date Time Provider Vinita Vergara   1/25/2021 10:30 AM Nida StocktonDoyle [facility]   1/28/2021 12:00 PM Janyce Shaper [id number]   2/2/2021 10:30 AM Janyce Shaper [facility]   2/4/2021 11:15 AM Janyce Shaper [facility]   2/9/2021 10:30 AM Janyce Shaper [facility]   2/11/2021 10:30 AM Janyce Shaper [facility]   2/16/2021  2:30 PM Brad Burns MD [facility]   7/12/2021 10:15 AM Kay Servin MD [facility]

## 2021-01-25 ENCOUNTER — HOSPITAL ENCOUNTER (OUTPATIENT)
Dept: PHYSICAL THERAPY | Age: 41
Discharge: HOME | End: 2021-01-25
Payer: MEDICAID

## 2021-01-25 PROCEDURE — 97530 THERAPEUTIC ACTIVITIES: CPT

## 2021-01-25 PROCEDURE — 97014 ELECTRIC STIMULATION THERAPY: CPT

## 2021-01-25 PROCEDURE — 97112 NEUROMUSCULAR REEDUCATION: CPT

## 2021-01-25 PROCEDURE — 97110 THERAPEUTIC EXERCISES: CPT

## 2021-01-25 NOTE — PROGRESS NOTES
PT DAILY TREATMENT NOTE     Patient Name: Lit Brownlee  Date:2021  : 1980  [x]  Patient  Verified  Payor: Solitario Hdez / [place] [employer] [employer] / Product Type: Managed Care Medicaid /    In time:10:20  Out time:11:06  Total Treatment Time (min): 55  Visit #: 4 of 10    Treatment Area: Low back pain [M54.5]  Spondylosis without myelopathy or radiculopathy, lumbar region [M47.816]  Paresthesia of skin [R20.2]    SUBJECTIVE  Pain Level (0-10 scale): 7  Any medication changes, allergies to medications, adverse drug reactions, diagnosis change, or new procedure performed?: [x] No    [] Yes (see summary sheet for update)  Subjective functional status/changes:   [] No changes reported  Pt reports feeling about the same    OBJECTIVE    Modality rationale: decrease pain to improve the patients ability to perform daily tasks   Min Type Additional Details   15 [x] Estim:  [x]Unatt       [x]IFC  []Premod                        []Other:  []w/ice   [x]w/heat  Position: seated  Location:low back    [] Estim: []Att    []TENS instruct  []NMES                    []Other:  []w/US   []w/ice   []w/heat  Position:  Location:    []  Traction: [] Cervical       []Lumbar                       [] Prone          []Supine                       []Intermittent   []Continuous Lbs:  [] before manual  [] after manual    []  Ultrasound: []Continuous   [] Pulsed                           []1MHz   []3MHz W/cm2:  Location:    []  Iontophoresis with dexamethasone         Location: [] Take home patch   [] In clinic    []  Ice     []  heat  []  Ice massage  []  Laser   []  Anodyne Position:  Location:    []  Laser with stim  []  Other:  Position:  Location:    []  Vasopneumatic Device Pressure:       [] lo [] med [] hi   Temperature: [] lo [] med [] hi   [] Skin assessment post-treatment:  []intact []redness- no adverse reaction    []redness  adverse reaction:     11 min Therapeutic Exercise:  [x] See flow sheet : Rationale: increase ROM and increase strength to improve the patients ability to perform ADLs    10 min Therapeutic Activity:  [x]  See flow sheet :   Rationale: increase strength, improve coordination and increase proprioception  to improve the patients ability to increase ease with daily tasks     10 min Neuromuscular Re-education:  [x]  See flow sheet :   Rationale: increase strength and increase proprioception  to improve the patients ability to perform functional tasks            With   [] TE   [] TA   [] neuro   [] other: Patient Education: [x] Review HEP    [] Progressed/Changed HEP based on:   [] positioning   [] body mechanics   [] transfers   [] heat/ice application    [] other:      Other Objective/Functional Measures:   Postural cues with Bishop ex's     Pain Level (0-10 scale) post treatment: 5    ASSESSMENT/Changes in Function: Pt cont's to be challenged with prolonged standing. Postural cues in standing with Juju ex's. Challenged with maintaining TA activation with PPT. Decr'd pain following treatment today. Patient will continue to benefit from skilled PT services to modify and progress therapeutic interventions, address functional mobility deficits, address ROM deficits, address strength deficits, analyze and address soft tissue restrictions, analyze and cue movement patterns, analyze and modify body mechanics/ergonomics and assess and modify postural abnormalities to attain remaining goals. []  See Plan of Care  []  See progress note/recertification  []  See Discharge Summary         Progress towards goals / Updated goals:  Short Term Goals: To be accomplished in 2 treatments:  1. Pt will report compliance and independence to HEP to help the pt manage their pain and symptoms.             Eval: established  Current: Met- pt reports compliance with HEP 1/25/2021   Long Term Goals: To be accomplished in 10 treatments:  1.  Pt will increase FOTO score to 61 points to improve ability to perform ADLs. Eval: 56 points  Current: reassess at PN  2. Pt will increase MMT left hip flex to 4+/5, left hip IR/ER to 4/5 to improve ability to tolerate household chores. Eval: left hip flex 4/5, left hip IR/ER 4-/5  3. Pt will increase AROM l/s EXT to % of WNL with mild to no increased LBP to improve ease of sleeping. Eval: 50-75% of WNL with LBP  4.  Pt will report being able to sit/ambulate for 10 mins with minimal to no increased LBP to improve tolerance to community/household activities.   Eval: constant changing of positions noted in sitting on plinth today during the evaluation, reports having increased LBP with ambulation.     PLAN  []  Upgrade activities as tolerated     [x]  Continue plan of care  []  Update interventions per flow sheet       []  Discharge due to:_  []  Other:_      Sandi Lutz PTA 1/25/2021  10:11 AM    Future Appointments   Date Time Provider Vinita Vergara   1/25/2021 10:30 AM Britt Rivas PTA [facility]   1/28/2021 12:00 PM Viv Latus [id number] [facility]   2/2/2021 10:30 AM Viv Latus [facility]   2/4/2021 11:15 AM Viv Latus [facility]   2/9/2021 10:30 AM Viv Latus [facility]   2/11/2021 10:30 AM Viv Latus WADCIHD [facility]   2/16/2021  2:30 PM Kianna Burns MD [facility]   7/12/2021 10:15 AM Krys Watson MD [facility] AM[facility]

## 2021-02-02 ENCOUNTER — HOSPITAL ENCOUNTER (OUTPATIENT)
Dept: PHYSICAL THERAPY | Age: 41
Discharge: HOME | End: 2021-02-02
Payer: MEDICAID

## 2021-02-02 PROCEDURE — 97014 ELECTRIC STIMULATION THERAPY: CPT

## 2021-02-02 PROCEDURE — 97530 THERAPEUTIC ACTIVITIES: CPT

## 2021-02-02 PROCEDURE — 97110 THERAPEUTIC EXERCISES: CPT

## 2021-02-02 PROCEDURE — 97112 NEUROMUSCULAR REEDUCATION: CPT

## 2021-02-02 NOTE — PROGRESS NOTES
PT DAILY TREATMENT NOTE     Patient Name: Brandon Joseph  Date:2021  : 1980  [x]  Patient  Verified  Payor: Esequiel Rosas / Plan: [id number] / Product Type: Managed Care Medicaid /    In time: 10:30  Out time:11:35  Total Treatment Time (min): 65  Visit #: 5 of 10    Medicare/BCBS Only   Total Timed Codes (min):  50 1:1 Treatment Time:  50       Treatment Area: Low back pain [M54.5]  Spondylosis without myelopathy or radiculopathy, lumbar region [M47.816]  Paresthesia of skin [R20.2]    SUBJECTIVE  Pain Level (0-10 scale): 5  Any medication changes, allergies to medications, adverse drug reactions, diagnosis change, or new procedure performed?: [x] No    [] Yes (see summary sheet for update)  Subjective functional status/changes:   [] No changes reported  Patient reports she has more difficulty sitting than with walking; unable to sit for long, but is able to walk about 10 min before she experiences pain. OBJECTIVE    Modality rationale: decrease pain and increase tissue extensibility to improve the patients ability to  tolerate exercises and return to daily activity with ease.    Min Type Additional Details   15 [x] Estim:  [x]Unatt       [x]IFC  []Premod                        []Other:  []w/ice   [x]w/heat  Position: seated  Location: low back    [] Estim: []Att    []TENS instruct  []NMES                    []Other:  []w/US   []w/ice   []w/heat  Position:  Location:    []  Traction: [] Cervical       []Lumbar                       [] Prone          []Supine                       []Intermittent   []Continuous Lbs:  [] before manual  [] after manual    []  Ultrasound: []Continuous   [] Pulsed                           []1MHz   []3MHz W/cm2:  Location:    []  Iontophoresis with dexamethasone         Location: [] Take home patch   [] In clinic    []  Ice     []  heat  []  Ice massage  []  Laser   []  Anodyne Position:  Location:    []  Laser with stim  []  Other: Position:  Location:    []  Vasopneumatic Device Pressure:       [] lo [] med [] hi   Temperature: [] lo [] med [] hi   [] Skin assessment post-treatment:  []intact []redness- no adverse reaction    []redness  adverse reaction:     18 min Therapeutic Exercise:  [x] See flow sheet :   Rationale: increase ROM and increase strength to improve the patients ability to perform ADLs with ease    15 min Therapeutic Activity:  [x]  See flow sheet :   Rationale: increase strength, improve coordination and increase proprioception  to improve the patients ability to  perform daily tasks and return to PLOF     17 min Neuromuscular Re-education:  [x]  See flow sheet :   Rationale: improve coordination and increase proprioception  to improve the patients ability to stabilize, use proper body mechanics, and promote proper postural awareness        With   [x] TE   [x] TA   [x] neuro   [] other: Patient Education: [x] Review HEP    [] Progressed/Changed HEP based on:   [x] positioning   [x] body mechanics   [] transfers   [] heat/ice application    [] other:      Other Objective/Functional Measures:   Increased reps as tolerated by pt   Initiated clam in s/l bilat #1-3; appropriately challenged with some muscle fatigue in hip abductors  Added piriformis stretch to alleviate soreness post clamshells    Pain Level (0-10 scale) post treatment: 6     ASSESSMENT/Changes in Function: Patient is making slow improvements towards goals with improved ambulation tolerance. She continues to present with increased muscle fatigue with LE and core strengthening in supine position. Improved execution of PPT, however continues to need cuing for TA activation.  Pt reported hip soreness post treatment    Patient will continue to benefit from skilled PT services to modify and progress therapeutic interventions, address functional mobility deficits, address ROM deficits, address strength deficits, analyze and address soft tissue restrictions, analyze and cue movement patterns, analyze and modify body mechanics/ergonomics, assess and modify postural abnormalities, address imbalance/dizziness and instruct in home and community integration to attain remaining goals. [x]  See Plan of Care  []  See progress note/recertification  []  See Discharge Summary         Progress towards goals / Updated goals:  Short Term Goals: To be accomplished in 2 treatments:  1. Pt will report compliance and independence to HEP to help the pt manage their pain and symptoms.             Eval: established  Current: Met- pt reports compliance with HEP 1/25/2021   Long Term Goals: To be accomplished in 10 treatments:  1. Pt will increase FOTO score to 61 points to improve ability to perform ADLs. Eval: 56 points  Current: reassess at PN  2. Pt will increase MMT left hip flex to 4+/5, left hip IR/ER to 4/5 to improve ability to tolerate household chores. Eval: left hip flex 4/5, left hip IR/ER 4-/5  3. Pt will increase AROM l/s EXT to % of WNL with mild to no increased LBP to improve ease of sleeping. Eval: 50-75% of WNL with LBP  4.  Pt will report being able to sit/ambulate for 10 mins with minimal to no increased LBP to improve tolerance to community/household activities.   Eval: constant changing of positions noted in sitting on plinth today during the evaluation, reports having increased LBP with ambulation.   Current: reports ~ 7 min sitting tolerance; ~10 min ambulation tolerance PROGRESSING 2/2/21    PLAN  [x]  Upgrade activities as tolerated     [x]  Continue plan of care  []  Update interventions per flow sheet       []  Discharge due to:_  []  Other:_      CHUCK Swartz 2/2/2021  10:35 AM    Future Appointments   Date Time Provider Vinita Vergara   2/4/2021 11:15 AM Martha Bora [facility]   2/9/2021 10:30 AM Martha Bora [facility]   2/11/2021 10:30 AM Martha Bora [facility]   2/16/2021  2:30 PM Celina Burns MD [facility]   7/12/2021 10:15 AM MD FLORECITA Perez BS AM[facility]
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
## 2021-02-15 NOTE — PROGRESS NOTES
Jerome Lopez is a 37 y.o. female, evaluated via audio-only technology on 2/16/2021 for Anemia. Assessment & Plan:   # Microcytic Anemia  # Chronic Anemia   # Iron deficiency anemia  -- The patient has a past medical history significant of anemia and GERD.  -- 7/27/2020 CBC reported hemoglobin was of 7.3, hematocrit was 24.1%. MCV was low at 58.8. Total WBC was 6.6, and platelet was 369,920. No new iron study/ferritin was available to review. -- Lab reviews indicated chronic microcytic anemia since at least 9/17/2010, hemoglobin was of 9.  -- The patient reported chronic fatigue and ice craving. She has f/u GYN for chronic heavy menstrual periods, s.p tubal ligations b.l. Strong family hx of anemia. -- Today I have reviewed with the patient about labs findings. 1/21/2021 CBC reported hemoglobin 6.8, hematocrit 24.6%, MCV 58, total WBC 8.7, and platelet 262,070. Iron saturation was unable to calculate, iron less than 19, and ferritin was 3.  1/21/202 Hemoglobin electrophoresis (alkaline acetate, confirmed by acid agar) shows a Hemoglobin A/C pattern. Blood film shows cigar-shaped erythrocytes and hypochromic microcytes. CBC shows moderate to marked anemia, a low MCV, a low MCH, and a high RDW. Iron studies show iron deficiency. Conclusion: Hemoglobin C trait. The blood film and CBC findings are consistent with iron deficiency. Plan:  -- I have discussed about the need of PRBC transfusion. The patient reports that she will go to hospital today, [facility], for PRBC transfusion supportively. -- The patient will need IV Iron. Plan to give IV Injectafer x 2, one week apart. I have discussed about the potential risk/ side effects of IV Iron replacement, she was agreeable with the plan. .   -- I will see the patient back in clinic in about 3 months. Always sooner if required.     12  Subjective:   Ms. Len Fisher is a most pleasant 45y.o. year old female who was seen initially for consultation of anemia. The patient has a past medical history significant of anemia and GERD.  7/27/2020 CBC reported hemoglobin was of 7.3, hematocrit was 24.1%. MCV was low at 58.8. Total WBC was 6.6, and platelet was 978,201. Lab reviews indicated chronic microcytic anemia since at least 9/17/2010, hemoglobin was of 9. The patient reported chronic fatigue and ice craving. She has f/u GYN for chronic heavy menstrual periods, s.p tubal ligations b.l. Since last visit she continues to report chronic fatigue and low energy level. Denied fever, chills, night sweat, unintentional weight loss, skin lumps or bumps, acute bleeding or bruising issues. No acute bleeding, blood in stool, dark stool, melena, hematochezia, hemoptysis, dark urine, or easily bruising. Denied headache, acute vision change, worsening dizziness, chest pain, worsen shortness of breath, palpitation, productive cough, nausea, vomiting, abdominal pain, altered bowel habits, dysuria, worsen bone pain or back pain, new focal numbness or weakness. Prior to Admission medications    Medication Sig Start Date End Date Taking? Authorizing Provider   naloxone (Narcan) 4 mg/actuation nasal spray 4 mg. 11/17/20   Provider, Historical   promethazine (PHENERGAN) 25 mg tablet Take 25 mg by mouth every six (6) hours as needed. 10/19/20   Provider, Historical   pregabalin (Lyrica) 75 mg capsule Take 1 Cap by mouth two (2) times a day. Max Daily Amount: 150 mg. 1/14/21   Yohana Jones MD   butalbital-acetaminophen-caff (Fioricet) -40 mg per capsule Take 1 Cap by mouth every four (4) hours as needed (Take 1-2 tablets ever 4 hours as need for pain.  Not to exceed 6 tablets in a 24 hour period. ). 4/29/20   KINSEY Arias   ondansetron hcl (Zofran) 4 mg tablet Take 2 Tabs by mouth every eight (8) hours as needed for Nausea. 4/29/20   KINSEY Peters   ferrous sulfate 325 mg (65 mg iron) tablet Take 1 Tab by mouth two (2) times a day.  4/27/20   Carrie Brown M, FNP         Review of Systems   Constitutional: Positive for malaise/fatigue. Negative for chills, diaphoresis, fever and weight loss. Respiratory: Negative for cough, hemoptysis, shortness of breath and wheezing. Cardiovascular: Negative for chest pain, palpitations and leg swelling. Gastrointestinal: Negative for abdominal pain, diarrhea, heartburn, nausea and vomiting. Genitourinary: Negative for dysuria, frequency, hematuria and urgency. Musculoskeletal: Negative for joint pain and myalgias. Skin: Negative for itching and rash. Neurological: Negative for dizziness, seizures, weakness and headaches. Psychiatric/Behavioral: Negative for depression. The patient does not have insomnia. No flowsheet data found. Jacinda Ragland, who was evaluated through a patient-initiated, synchronous (real-time) audio only encounter, and/or her healthcare decision maker, is aware that it is a billable service, with coverage as determined by her insurance carrier. She provided verbal consent to proceed: Yes. She has not had a related appointment within my department in the past 7 days or scheduled within the next 24 hours.       Total Time: minutes: 11-20 minutes    Sal Main MD

## 2021-02-16 ENCOUNTER — VIRTUAL VISIT (OUTPATIENT)
Dept: ONCOLOGY | Age: 41
End: 2021-02-16
Payer: MEDICAID

## 2021-02-16 DIAGNOSIS — D64.9 CHRONIC ANEMIA: ICD-10-CM

## 2021-02-16 DIAGNOSIS — D50.9 MICROCYTIC ANEMIA: ICD-10-CM

## 2021-02-16 DIAGNOSIS — D50.0 IRON DEFICIENCY ANEMIA DUE TO CHRONIC BLOOD LOSS: Primary | ICD-10-CM

## 2021-02-16 PROCEDURE — 99442 PR PHYS/QHP TELEPHONE EVALUATION 11-20 MIN: CPT | Performed by: INTERNAL MEDICINE

## 2021-02-18 RX ORDER — HEPARIN 100 UNIT/ML
300-500 SYRINGE INTRAVENOUS AS NEEDED
Status: CANCELLED
Start: 2021-03-04

## 2021-02-18 RX ORDER — EPINEPHRINE 1 MG/ML
0.3 INJECTION, SOLUTION, CONCENTRATE INTRAVENOUS AS NEEDED
Status: CANCELLED | OUTPATIENT
Start: 2021-03-04

## 2021-02-18 RX ORDER — ACETAMINOPHEN 325 MG/1
650 TABLET ORAL AS NEEDED
Status: CANCELLED
Start: 2021-03-04

## 2021-02-18 RX ORDER — HYDROCORTISONE SODIUM SUCCINATE 100 MG/2ML
100 INJECTION, POWDER, FOR SOLUTION INTRAMUSCULAR; INTRAVENOUS AS NEEDED
Status: CANCELLED | OUTPATIENT
Start: 2021-03-04

## 2021-02-18 RX ORDER — DIPHENHYDRAMINE HYDROCHLORIDE 50 MG/ML
50 INJECTION, SOLUTION INTRAMUSCULAR; INTRAVENOUS AS NEEDED
Status: CANCELLED
Start: 2021-03-04

## 2021-02-18 RX ORDER — ALBUTEROL SULFATE 0.83 MG/ML
2.5 SOLUTION RESPIRATORY (INHALATION) AS NEEDED
Status: CANCELLED
Start: 2021-03-04

## 2021-02-18 RX ORDER — SODIUM CHLORIDE 9 MG/ML
10 INJECTION INTRAMUSCULAR; INTRAVENOUS; SUBCUTANEOUS AS NEEDED
Status: CANCELLED | OUTPATIENT
Start: 2021-03-04

## 2021-02-18 RX ORDER — ONDANSETRON 2 MG/ML
8 INJECTION INTRAMUSCULAR; INTRAVENOUS AS NEEDED
Status: CANCELLED | OUTPATIENT
Start: 2021-03-04

## 2021-02-18 RX ORDER — DIPHENHYDRAMINE HYDROCHLORIDE 50 MG/ML
25 INJECTION, SOLUTION INTRAMUSCULAR; INTRAVENOUS AS NEEDED
Status: CANCELLED
Start: 2021-03-04

## 2021-02-18 RX ORDER — SODIUM CHLORIDE 0.9 % (FLUSH) 0.9 %
10 SYRINGE (ML) INJECTION AS NEEDED
Status: CANCELLED | OUTPATIENT
Start: 2021-03-04

## 2021-02-18 RX ORDER — SODIUM CHLORIDE 9 MG/ML
25 INJECTION, SOLUTION INTRAVENOUS CONTINUOUS
Status: CANCELLED | OUTPATIENT
Start: 2021-03-04

## 2021-02-24 ENCOUNTER — HOSPITAL ENCOUNTER (OUTPATIENT)
Dept: INFUSION THERAPY | Age: 41
End: 2021-02-24

## 2021-03-03 ENCOUNTER — HOSPITAL ENCOUNTER (OUTPATIENT)
Dept: INFUSION THERAPY | Age: 41
End: 2021-03-03

## 2021-03-04 ENCOUNTER — HOSPITAL ENCOUNTER (OUTPATIENT)
Dept: INFUSION THERAPY | Age: 41
Discharge: HOME | End: 2021-03-04
Payer: MEDICAID

## 2021-03-04 VITALS
DIASTOLIC BLOOD PRESSURE: 81 MMHG | SYSTOLIC BLOOD PRESSURE: 117 MMHG | HEART RATE: 72 BPM | TEMPERATURE: 98.8 F | RESPIRATION RATE: 18 BRPM | OXYGEN SATURATION: 100 %

## 2021-03-04 DIAGNOSIS — D64.9 SYMPTOMATIC ANEMIA: Primary | ICD-10-CM

## 2021-03-04 PROCEDURE — 74011250636 HC RX REV CODE- 250/636: Performed by: INTERNAL MEDICINE

## 2021-03-04 PROCEDURE — 96365 THER/PROPH/DIAG IV INF INIT: CPT

## 2021-03-04 RX ORDER — DIPHENHYDRAMINE HYDROCHLORIDE 50 MG/ML
50 INJECTION, SOLUTION INTRAMUSCULAR; INTRAVENOUS AS NEEDED
Status: CANCELLED
Start: 2021-03-11

## 2021-03-04 RX ORDER — EPINEPHRINE 1 MG/ML
0.3 INJECTION, SOLUTION, CONCENTRATE INTRAVENOUS AS NEEDED
Status: CANCELLED | OUTPATIENT
Start: 2021-03-11

## 2021-03-04 RX ORDER — ACETAMINOPHEN 325 MG/1
650 TABLET ORAL AS NEEDED
Status: CANCELLED
Start: 2021-03-11

## 2021-03-04 RX ORDER — SODIUM CHLORIDE 9 MG/ML
25 INJECTION, SOLUTION INTRAVENOUS CONTINUOUS
Status: CANCELLED | OUTPATIENT
Start: 2021-03-11

## 2021-03-04 RX ORDER — HEPARIN 100 UNIT/ML
300-500 SYRINGE INTRAVENOUS AS NEEDED
Status: CANCELLED
Start: 2021-03-11

## 2021-03-04 RX ORDER — SODIUM CHLORIDE 9 MG/ML
25 INJECTION, SOLUTION INTRAVENOUS CONTINUOUS
Status: DISPENSED | OUTPATIENT
Start: 2021-03-04 | End: 2021-03-04

## 2021-03-04 RX ORDER — SODIUM CHLORIDE 0.9 % (FLUSH) 0.9 %
10 SYRINGE (ML) INJECTION AS NEEDED
Status: DISPENSED | OUTPATIENT
Start: 2021-03-04 | End: 2021-03-04

## 2021-03-04 RX ORDER — HYDROCORTISONE SODIUM SUCCINATE 100 MG/2ML
100 INJECTION, POWDER, FOR SOLUTION INTRAMUSCULAR; INTRAVENOUS AS NEEDED
Status: CANCELLED | OUTPATIENT
Start: 2021-03-11

## 2021-03-04 RX ORDER — DIPHENHYDRAMINE HYDROCHLORIDE 50 MG/ML
25 INJECTION, SOLUTION INTRAMUSCULAR; INTRAVENOUS AS NEEDED
Status: CANCELLED
Start: 2021-03-11

## 2021-03-04 RX ORDER — ALBUTEROL SULFATE 0.83 MG/ML
2.5 SOLUTION RESPIRATORY (INHALATION) AS NEEDED
Status: CANCELLED
Start: 2021-03-11

## 2021-03-04 RX ORDER — ONDANSETRON 2 MG/ML
8 INJECTION INTRAMUSCULAR; INTRAVENOUS AS NEEDED
Status: CANCELLED | OUTPATIENT
Start: 2021-03-11

## 2021-03-04 RX ORDER — SODIUM CHLORIDE 0.9 % (FLUSH) 0.9 %
10 SYRINGE (ML) INJECTION AS NEEDED
Status: CANCELLED | OUTPATIENT
Start: 2021-03-11

## 2021-03-04 RX ORDER — SODIUM CHLORIDE 9 MG/ML
10 INJECTION INTRAMUSCULAR; INTRAVENOUS; SUBCUTANEOUS AS NEEDED
Status: CANCELLED | OUTPATIENT
Start: 2021-03-11

## 2021-03-04 RX ADMIN — SODIUM CHLORIDE 25 ML/HR: 9 INJECTION, SOLUTION INTRAVENOUS at 11:12

## 2021-03-04 RX ADMIN — FERRIC CARBOXYMALTOSE INJECTION 750 MG: 50 INJECTION, SOLUTION INTRAVENOUS at 11:15

## 2021-03-04 NOTE — PROGRESS NOTES
[facility] Progress Note    Date: 2021    Name: Nancy Thompson    MRN: [id number]         : 1980     Criscorey Minks #1 OF 2    Ms. Kameron Zuleta was assessed and education was provided. Ms. Roshan Bennett vitals were reviewed and patient was observed for 5 minutes prior to treatment. Ms. Kameron Zuleta instructed on signs and symptoms of an allergic reaction, all questions answered. Visit Vitals  /81 (BP 1 Location: Right upper arm, BP Patient Position: At rest)   Pulse 72   Temp 98.8 °F (37.1 °C)   Resp 18   SpO2 100%   Breastfeeding No       Patient Vitals for the past 8 hrs:   Temp Pulse Resp BP SpO2   21 1212 98.8 °F (37.1 °C) 72 18 117/81 100 %   21 1139 98.8 °F (37.1 °C) 66 18 118/76 100 %   21 1053 98.5 °F (36.9 °C) 67 18 130/79 100 %         #24 PIV started in left ac by Liiiike after 4 attempts. Injectafer 750mg mixed in 250mls NS was infused over 20 minutes as ordered. Upon completion vitals completed and line flushed with 20mls NS. Ms. Kameron Zuleta observed for 30 minutes. No signs or symptoms of an allergic reaction. Vitals completed and PIV removed 2x2 and coban applied. Patient instructed on signs and symptoms of an allergic reaction and what to do if she develops one. Ms. Kameron Zuleta tolerated the infusion, and had no complaints. Patient armband removed and shredded. Ms. Kameron Zuleta was discharged from [place] [address] in stable condition at 1215. She is to return on 3/11/21 at 1400 for her next injectafer appointment.     Jasmin Donahue RN  2021  12:54 PM

## 2021-03-11 ENCOUNTER — HOSPITAL ENCOUNTER (OUTPATIENT)
Dept: INFUSION THERAPY | Age: 41
Discharge: HOME | End: 2021-03-11
Payer: MEDICAID

## 2021-03-11 VITALS
SYSTOLIC BLOOD PRESSURE: 122 MMHG | OXYGEN SATURATION: 100 % | TEMPERATURE: 98.9 F | RESPIRATION RATE: 16 BRPM | DIASTOLIC BLOOD PRESSURE: 77 MMHG | HEART RATE: 63 BPM

## 2021-03-11 DIAGNOSIS — D64.9 SYMPTOMATIC ANEMIA: Primary | ICD-10-CM

## 2021-03-11 PROCEDURE — 96365 THER/PROPH/DIAG IV INF INIT: CPT

## 2021-03-11 PROCEDURE — 74011250636 HC RX REV CODE- 250/636: Performed by: INTERNAL MEDICINE

## 2021-03-11 RX ORDER — HEPARIN 100 UNIT/ML
300-500 SYRINGE INTRAVENOUS AS NEEDED
Status: CANCELLED
Start: 2021-03-11

## 2021-03-11 RX ORDER — SODIUM CHLORIDE 9 MG/ML
25 INJECTION, SOLUTION INTRAVENOUS CONTINUOUS
Status: DISCONTINUED | OUTPATIENT
Start: 2021-03-11 | End: 2021-03-12 | Stop reason: HOSPADM

## 2021-03-11 RX ORDER — ALBUTEROL SULFATE 0.83 MG/ML
2.5 SOLUTION RESPIRATORY (INHALATION) AS NEEDED
Status: CANCELLED
Start: 2021-03-11

## 2021-03-11 RX ORDER — HYDROCORTISONE SODIUM SUCCINATE 100 MG/2ML
100 INJECTION, POWDER, FOR SOLUTION INTRAMUSCULAR; INTRAVENOUS AS NEEDED
Status: CANCELLED | OUTPATIENT
Start: 2021-03-11

## 2021-03-11 RX ORDER — EPINEPHRINE 1 MG/ML
0.3 INJECTION, SOLUTION, CONCENTRATE INTRAVENOUS AS NEEDED
Status: CANCELLED | OUTPATIENT
Start: 2021-03-11

## 2021-03-11 RX ORDER — SODIUM CHLORIDE 9 MG/ML
10 INJECTION INTRAMUSCULAR; INTRAVENOUS; SUBCUTANEOUS AS NEEDED
Status: CANCELLED | OUTPATIENT
Start: 2021-03-11

## 2021-03-11 RX ORDER — DIPHENHYDRAMINE HYDROCHLORIDE 50 MG/ML
50 INJECTION, SOLUTION INTRAMUSCULAR; INTRAVENOUS AS NEEDED
Status: CANCELLED
Start: 2021-03-11

## 2021-03-11 RX ORDER — SODIUM CHLORIDE 9 MG/ML
25 INJECTION, SOLUTION INTRAVENOUS CONTINUOUS
Status: CANCELLED | OUTPATIENT
Start: 2021-03-11

## 2021-03-11 RX ORDER — ACETAMINOPHEN 325 MG/1
650 TABLET ORAL AS NEEDED
Status: CANCELLED
Start: 2021-03-11

## 2021-03-11 RX ORDER — SODIUM CHLORIDE 0.9 % (FLUSH) 0.9 %
10 SYRINGE (ML) INJECTION AS NEEDED
Status: CANCELLED | OUTPATIENT
Start: 2021-03-11

## 2021-03-11 RX ORDER — ONDANSETRON 2 MG/ML
8 INJECTION INTRAMUSCULAR; INTRAVENOUS AS NEEDED
Status: CANCELLED | OUTPATIENT
Start: 2021-03-11

## 2021-03-11 RX ORDER — SODIUM CHLORIDE 0.9 % (FLUSH) 0.9 %
10 SYRINGE (ML) INJECTION AS NEEDED
Status: DISCONTINUED | OUTPATIENT
Start: 2021-03-11 | End: 2021-03-12 | Stop reason: HOSPADM

## 2021-03-11 RX ORDER — DIPHENHYDRAMINE HYDROCHLORIDE 50 MG/ML
25 INJECTION, SOLUTION INTRAMUSCULAR; INTRAVENOUS AS NEEDED
Status: CANCELLED
Start: 2021-03-11

## 2021-03-11 RX ORDER — SODIUM CHLORIDE 9 MG/ML
10 INJECTION INTRAMUSCULAR; INTRAVENOUS; SUBCUTANEOUS AS NEEDED
Status: DISCONTINUED | OUTPATIENT
Start: 2021-03-11 | End: 2021-03-12 | Stop reason: HOSPADM

## 2021-03-11 RX ADMIN — SODIUM CHLORIDE 25 ML/HR: 9 INJECTION, SOLUTION INTRAVENOUS at 13:58

## 2021-03-11 RX ADMIN — Medication 10 ML: at 13:50

## 2021-03-11 RX ADMIN — FERRIC CARBOXYMALTOSE INJECTION 750 MG: 50 INJECTION, SOLUTION INTRAVENOUS at 13:58

## 2021-03-11 RX ADMIN — SODIUM CHLORIDE 10 ML: 9 INJECTION INTRAMUSCULAR; INTRAVENOUS; SUBCUTANEOUS at 14:21

## 2021-03-11 NOTE — PROGRESS NOTES
[facility] Progress Note    Date: 2021    Name: Tu Abraham    MRN: [id number]         : 1980     Drew Peng #2 OF 2    Arrived to [facility] at [phone] ambulatory. No complaints or concerns voiced    Ms. Messi Smith was assessed and education was provided. Patient declined care notes since she received injectafer last week without problems. Ms. Philip Del Cid vitals were reviewed and patient was observed for 5 minutes prior to treatment. Ms. Messi Smith instructed on signs and symptoms of an allergic reaction, all questions answered. Visit Vitals  /77 (BP 1 Location: Right upper arm, BP Patient Position: At rest)   Pulse 63   Temp 98.9 °F (37.2 °C)   Resp 16   SpO2 100%       Patient Vitals for the past 8 hrs:   Temp Pulse Resp BP SpO2   21 1423 98.9 °F (37.2 °C) 63 16 122/77 100 %   21 1345 97.9 °F (36.6 °C) 73 18 126/75 100 %         #24 PIV started in left ac by ABILIO Vincent x 1 attempt    Injectafer 750mg mixed in 250mls NS was infused over 20 minutes as ordered. Upon completion vitals completed and line flushed with 20mls NS, then PIV removed. Site without bruising, bleeding, swelling or tenderness. Gauze/coban applied. Patient declined to stay for observation. Patient instructed on signs and symptoms of an allergic reaction and what to do if she develops one. Reviewed discharge instructions with patient. She verbalized understanding. Ms. Messi Smith tolerated the infusion, and had no complaints. Patient armband removed and shredded. Ms. Messi Smith was discharged from [place] [address] in stable condition at 1425. She has no further appointment with [facility].     Bill Huber RN  2021  12:54 PM

## 2021-06-16 ENCOUNTER — TELEPHONE (OUTPATIENT)
Dept: ORTHOPEDIC SURGERY | Age: 41
End: 2021-06-16

## 2021-06-16 NOTE — TELEPHONE ENCOUNTER
We have not seen her since January at which time she was light duty. She needs a visit.   Can be VV with NP

## 2021-06-16 NOTE — TELEPHONE ENCOUNTER
Called patient [phone] and the phone gave a busy signal. I then called [phone] and it is unable to take incoming calls and then went busy. I was calling to inform patient per the provider that we have not seen her since January and at which time she was on light duty. She would need a visit to be evaluated before we can clear her for work. Patient can be offered a virtual visit today or Friday with LATONIA Burgess this week. Thank you.

## 2021-06-16 NOTE — TELEPHONE ENCOUNTER
Patient is requesting a note stating she has been under the care of our practice for the past year and can now return to work without restrictions or limitations.       Patient [phone]

## 2021-08-17 ENCOUNTER — VIRTUAL VISIT (OUTPATIENT)
Dept: ORTHOPEDIC SURGERY | Age: 41
End: 2021-08-17
Payer: MEDICAID

## 2021-08-17 DIAGNOSIS — M47.816 LUMBAR SPONDYLOSIS: Primary | ICD-10-CM

## 2021-08-17 PROCEDURE — 99443 PR PHYS/QHP TELEPHONE EVALUATION 21-30 MIN: CPT | Performed by: NURSE PRACTITIONER

## 2021-08-17 NOTE — PROGRESS NOTES
History of Present Illness:    Consent: Nichelle Elizabeth, who was seen by telephone call and/or her healthcare decision maker, is aware that this patient-initiated, Telehealth encounter on 8/17/2021 is a billable service, with coverage as determined by her insurance carrier. She is aware that she may receive a bill and has provided verbal consent to proceed: Yes. The provider was located at [facility] office  and the patient was located at their car. No one else participated in the visit with the patient. The platform used was telephone call    She was evaluated for her back pain. She used to have burning in her leg but that has subsided. Jonah Acosta had given her light duty restrictions of no repetitive bending, twisting, lifting and no lifting greater than 20 pounds. Based on the patient's lumbar MRI Dr. Simmons Door not keep her out of work. Today she states she still has some back pain off and on but she is wondering if it is more from some GI issues she was having. She is only taking the Lyrica sporadically. She states that when Dr. 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 So she has not been working all this time. We first saw her November 24, 2020 and the last time I saw her was January 14, 2021. She states now she is at the point where she can return back to work full duty and needs [facility] paperwork filled out. Physical Exam: Patient is a 81-year-old female who is alert and oriented. She spoke with fluency. She did not appear to be distressed. Assessment & Plan: This patient with lumbar spondylosis. She is at a point where she can return back to work full duty. She will bring us the [facility] paperwork and we will fill it out. She can return back to work on August 23, 2021 full duty. We will see her back as needed. Diagnoses and all orders for this visit:    1.  Lumbar spondylosis          Pain Scale: /10             We discussed the expected course, resolution and complications of the diagnosis(es) in detail. Medication risks, benefits, costs, interactions, and alternatives were discussed as indicated. I advised her to contact the office if her condition worsens, changes or fails to improve as anticipated. For emergencies or worsening neurological symptoms the patient was instructed to call 911. She expressed understanding with the diagnosis(es) and plan. Follow-up and Dispositions    · Return if symptoms worsen or fail to improve. Андрей Martinez is a 37 y.o. female being evaluated by a video visit encounter for concerns as above. A caregiver was present when appropriate. Due to this being a TeleHealth encounter (During ZALRW-01 public health emergency), evaluation of the following organ systems was limited: Vitals/Constitutional/EENT/Resp/CV/GI//MS/Neuro/Skin/Heme-Lymph-Imm. Pursuant to the emergency declaration under the [address] [facility] [address] waiver authority and the [employer] Act, this Virtual  Visit was conducted, with patient's (and/or legal guardian's) consent, to reduce the patient's risk of exposure to COVID-19 and provide necessary medical care. CPT Codes 76155-23124 for Established Patients may apply to this Telehealth Visit  Time-based coding, delete if not needed: I spent at least 15 minutes with this established patient, and >50% of the time was spent counseling and/or coordinating care regarding Back pain and work status  Start time: 3:35 PM and Stop time: 4:00 PM.        Due to this being a TeleHealth evaluation, many elements of the physical examination are unable to be assessed.        Pursuant to the emergency declaration under the [address] [facility] [place] [address] and Response Supplemental Appropriations Act, this Virtual  Visit was conducted, with patient's consent, to reduce the patient's risk of exposure to COVID-19 and provide continuity of care for an established patient. Services were provided through a video synchronous discussion virtually to substitute for in-person clinic visit.     Simran Brenner NP

## 2021-09-03 ENCOUNTER — TELEPHONE (OUTPATIENT)
Dept: ORTHOPEDIC SURGERY | Age: 41
End: 2021-09-03

## 2021-09-03 NOTE — TELEPHONE ENCOUNTER
Left message for patient that her form will be filled out by Edwige Jeff when she returns on Tuesday 09-

## 2021-09-03 NOTE — TELEPHONE ENCOUNTER
Patient is asking for status on [employer] form that was scanned into chart on 08/20/21. She is in need of getting this to her employer. Please inform when completed.      Patient [phone] or [phone]

## 2021-09-07 ENCOUNTER — TELEPHONE (OUTPATIENT)
Dept: ORTHOPEDIC SURGERY | Age: 41
End: 2021-09-07

## 2021-09-08 ENCOUNTER — DOCUMENTATION ONLY (OUTPATIENT)
Dept: ORTHOPEDIC SURGERY | Age: 41
End: 2021-09-08

## 2022-03-18 PROBLEM — E66.01 SEVERE OBESITY (HCC): Status: ACTIVE | Noted: 2020-11-23

## 2022-03-19 PROBLEM — D64.9 SYMPTOMATIC ANEMIA: Status: ACTIVE | Noted: 2018-03-11

## 2022-03-19 PROBLEM — N92.0 MENORRHAGIA WITH REGULAR CYCLE: Status: ACTIVE | Noted: 2018-03-11

## 2024-05-13 ENCOUNTER — HOSPITAL ENCOUNTER (EMERGENCY)
Facility: HOSPITAL | Age: 44
Discharge: LEFT BEFORE DISCHARGE | End: 2024-05-13
Payer: MEDICAID

## 2024-05-13 ENCOUNTER — APPOINTMENT (OUTPATIENT)
Facility: HOSPITAL | Age: 44
End: 2024-05-13
Payer: MEDICAID

## 2024-05-13 VITALS
DIASTOLIC BLOOD PRESSURE: 90 MMHG | HEART RATE: 84 BPM | RESPIRATION RATE: 20 BRPM | OXYGEN SATURATION: 100 % | SYSTOLIC BLOOD PRESSURE: 125 MMHG | TEMPERATURE: 99.9 F | HEIGHT: 66 IN | WEIGHT: 231 LBS | BODY MASS INDEX: 37.12 KG/M2

## 2024-05-13 DIAGNOSIS — N30.01 ACUTE CYSTITIS WITH HEMATURIA: ICD-10-CM

## 2024-05-13 DIAGNOSIS — R11.2 NAUSEA AND VOMITING, UNSPECIFIED VOMITING TYPE: Primary | ICD-10-CM

## 2024-05-13 DIAGNOSIS — E87.6 HYPOKALEMIA: ICD-10-CM

## 2024-05-13 DIAGNOSIS — N94.9 ADNEXAL CYST: ICD-10-CM

## 2024-05-13 LAB
ALBUMIN SERPL-MCNC: 4.1 G/DL (ref 3.4–5)
ALBUMIN/GLOB SERPL: 0.9 (ref 0.8–1.7)
ALP SERPL-CCNC: 52 U/L (ref 45–117)
ALT SERPL-CCNC: 22 U/L (ref 13–56)
ANION GAP SERPL CALC-SCNC: 7 MMOL/L (ref 3–18)
APPEARANCE UR: ABNORMAL
AST SERPL-CCNC: 17 U/L (ref 10–38)
BACTERIA URNS QL MICRO: ABNORMAL /HPF
BASOPHILS # BLD: 0.1 K/UL (ref 0–0.1)
BASOPHILS NFR BLD: 1 % (ref 0–2)
BILIRUB SERPL-MCNC: 0.7 MG/DL (ref 0.2–1)
BILIRUB UR QL: ABNORMAL
BUN SERPL-MCNC: 7 MG/DL (ref 7–18)
BUN/CREAT SERPL: 8 (ref 12–20)
CALCIUM SERPL-MCNC: 9.6 MG/DL (ref 8.5–10.1)
CHLORIDE SERPL-SCNC: 108 MMOL/L (ref 100–111)
CO2 SERPL-SCNC: 23 MMOL/L (ref 21–32)
COLOR UR: ABNORMAL
CREAT SERPL-MCNC: 0.86 MG/DL (ref 0.6–1.3)
DIFFERENTIAL METHOD BLD: ABNORMAL
EOSINOPHIL # BLD: 0.1 K/UL (ref 0–0.4)
EOSINOPHIL NFR BLD: 1 % (ref 0–5)
EPITH CASTS URNS QL MICRO: ABNORMAL /LPF (ref 0–5)
ERYTHROCYTE [DISTWIDTH] IN BLOOD BY AUTOMATED COUNT: 22.7 % (ref 11.6–14.5)
FLUAV RNA SPEC QL NAA+PROBE: NOT DETECTED
FLUBV RNA SPEC QL NAA+PROBE: NOT DETECTED
GLOBULIN SER CALC-MCNC: 4.6 G/DL (ref 2–4)
GLUCOSE SERPL-MCNC: 86 MG/DL (ref 74–99)
GLUCOSE UR STRIP.AUTO-MCNC: NEGATIVE MG/DL
HCG UR QL: NEGATIVE
HCT VFR BLD AUTO: 29.2 % (ref 35–45)
HGB BLD-MCNC: 8.7 G/DL (ref 12–16)
HGB UR QL STRIP: ABNORMAL
IMM GRANULOCYTES # BLD AUTO: 0 K/UL (ref 0–0.04)
IMM GRANULOCYTES NFR BLD AUTO: 0 % (ref 0–0.5)
KETONES UR QL STRIP.AUTO: 40 MG/DL
LEUKOCYTE ESTERASE UR QL STRIP.AUTO: ABNORMAL
LIPASE SERPL-CCNC: 16 U/L (ref 13–75)
LYMPHOCYTES # BLD: 2.2 K/UL (ref 0.9–3.6)
LYMPHOCYTES NFR BLD: 33 % (ref 21–52)
MAGNESIUM SERPL-MCNC: 1.9 MG/DL (ref 1.6–2.6)
MCH RBC QN AUTO: 16.9 PG (ref 24–34)
MCHC RBC AUTO-ENTMCNC: 29.8 G/DL (ref 31–37)
MCV RBC AUTO: 56.7 FL (ref 78–100)
MONOCYTES # BLD: 0.7 K/UL (ref 0.05–1.2)
MONOCYTES NFR BLD: 10 % (ref 3–10)
NEUTS SEG # BLD: 3.6 K/UL (ref 1.8–8)
NEUTS SEG NFR BLD: 55 % (ref 40–73)
NITRITE UR QL STRIP.AUTO: POSITIVE
NRBC # BLD: 0 K/UL (ref 0–0.01)
NRBC BLD-RTO: 0 PER 100 WBC
PH UR STRIP: 5.5 (ref 5–8)
PLATELET # BLD AUTO: 364 K/UL (ref 135–420)
PLATELET COMMENT: ABNORMAL
POTASSIUM SERPL-SCNC: 2.9 MMOL/L (ref 3.5–5.5)
PROT SERPL-MCNC: 8.7 G/DL (ref 6.4–8.2)
PROT UR STRIP-MCNC: 30 MG/DL
RBC # BLD AUTO: 5.15 M/UL (ref 4.2–5.3)
RBC #/AREA URNS HPF: ABNORMAL /HPF (ref 0–5)
RBC MORPH BLD: ABNORMAL
SARS-COV-2 RNA RESP QL NAA+PROBE: NOT DETECTED
SODIUM SERPL-SCNC: 138 MMOL/L (ref 136–145)
SP GR UR REFRACTOMETRY: 1.03 (ref 1–1.03)
UROBILINOGEN UR QL STRIP.AUTO: 2 EU/DL (ref 0.2–1)
WBC # BLD AUTO: 6.7 K/UL (ref 4.6–13.2)
WBC URNS QL MICRO: ABNORMAL /HPF (ref 0–4)

## 2024-05-13 PROCEDURE — 83735 ASSAY OF MAGNESIUM: CPT

## 2024-05-13 PROCEDURE — 81025 URINE PREGNANCY TEST: CPT

## 2024-05-13 PROCEDURE — 83690 ASSAY OF LIPASE: CPT

## 2024-05-13 PROCEDURE — 96365 THER/PROPH/DIAG IV INF INIT: CPT

## 2024-05-13 PROCEDURE — 74177 CT ABD & PELVIS W/CONTRAST: CPT

## 2024-05-13 PROCEDURE — 87088 URINE BACTERIA CULTURE: CPT

## 2024-05-13 PROCEDURE — 6370000000 HC RX 637 (ALT 250 FOR IP)

## 2024-05-13 PROCEDURE — 6360000002 HC RX W HCPCS

## 2024-05-13 PROCEDURE — 96375 TX/PRO/DX INJ NEW DRUG ADDON: CPT

## 2024-05-13 PROCEDURE — 85025 COMPLETE CBC W/AUTO DIFF WBC: CPT

## 2024-05-13 PROCEDURE — 87636 SARSCOV2 & INF A&B AMP PRB: CPT

## 2024-05-13 PROCEDURE — 87186 SC STD MICRODIL/AGAR DIL: CPT

## 2024-05-13 PROCEDURE — 96361 HYDRATE IV INFUSION ADD-ON: CPT

## 2024-05-13 PROCEDURE — 6360000004 HC RX CONTRAST MEDICATION

## 2024-05-13 PROCEDURE — 80053 COMPREHEN METABOLIC PANEL: CPT

## 2024-05-13 PROCEDURE — 2580000003 HC RX 258

## 2024-05-13 PROCEDURE — 81001 URINALYSIS AUTO W/SCOPE: CPT

## 2024-05-13 PROCEDURE — 99285 EMERGENCY DEPT VISIT HI MDM: CPT

## 2024-05-13 PROCEDURE — 87086 URINE CULTURE/COLONY COUNT: CPT

## 2024-05-13 RX ORDER — ONDANSETRON 8 MG/1
8 TABLET, ORALLY DISINTEGRATING ORAL EVERY 8 HOURS PRN
Qty: 10 TABLET | Refills: 0 | Status: SHIPPED | OUTPATIENT
Start: 2024-05-13

## 2024-05-13 RX ORDER — POTASSIUM CHLORIDE 7.45 MG/ML
10 INJECTION INTRAVENOUS
Status: COMPLETED | OUTPATIENT
Start: 2024-05-13 | End: 2024-05-13

## 2024-05-13 RX ORDER — CEPHALEXIN 500 MG/1
500 CAPSULE ORAL 2 TIMES DAILY
Qty: 14 CAPSULE | Refills: 0 | Status: SHIPPED | OUTPATIENT
Start: 2024-05-13 | End: 2024-05-20

## 2024-05-13 RX ORDER — POTASSIUM CHLORIDE 750 MG/1
10 TABLET, EXTENDED RELEASE ORAL 2 TIMES DAILY
Qty: 180 TABLET | Refills: 0 | Status: SHIPPED | OUTPATIENT
Start: 2024-05-13

## 2024-05-13 RX ORDER — ONDANSETRON 2 MG/ML
4 INJECTION INTRAMUSCULAR; INTRAVENOUS
Status: COMPLETED | OUTPATIENT
Start: 2024-05-13 | End: 2024-05-13

## 2024-05-13 RX ORDER — 0.9 % SODIUM CHLORIDE 0.9 %
1000 INTRAVENOUS SOLUTION INTRAVENOUS ONCE
Status: COMPLETED | OUTPATIENT
Start: 2024-05-13 | End: 2024-05-13

## 2024-05-13 RX ADMIN — POTASSIUM CHLORIDE 10 MEQ: 7.46 INJECTION, SOLUTION INTRAVENOUS at 11:56

## 2024-05-13 RX ADMIN — SODIUM CHLORIDE 1000 ML: 9 INJECTION, SOLUTION INTRAVENOUS at 11:53

## 2024-05-13 RX ADMIN — ONDANSETRON 4 MG: 2 INJECTION INTRAMUSCULAR; INTRAVENOUS at 11:51

## 2024-05-13 RX ADMIN — POTASSIUM BICARBONATE 40 MEQ: 391 TABLET, EFFERVESCENT ORAL at 11:55

## 2024-05-13 RX ADMIN — IOPAMIDOL 100 ML: 612 INJECTION, SOLUTION INTRAVENOUS at 11:19

## 2024-05-13 ASSESSMENT — PAIN - FUNCTIONAL ASSESSMENT: PAIN_FUNCTIONAL_ASSESSMENT: NONE - DENIES PAIN

## 2024-05-13 NOTE — ED PROVIDER NOTES
EMERGENCY DEPARTMENT HISTORY AND PHYSICAL EXAM      Patient Name: Fide Ramírez  MRN: [id number]  YOB: 1980  Provider: Betsy Prieto PA-C  PCP: No primary care provider on file.   Time/Date of evaluation: 9:40 AM EDT on 5/13/24    History of Presenting Illness     Chief Complaint   Patient presents with    Emesis    Chills    Diarrhea    Nausea       History Provided By: Patient     History (Narative):   Fide Ramírez is a 44 y.o. female with a PMHX of Anemia, CKD. GERD  who presents to the emergency department  by POV C/O of nausea, vomiting, chills, diarrhea.  She states that she has been having symptoms for the past 4 days.  She states that she was incarcerated over the weekend, and could have been exposed to somebody that is been sick.  She denies any over-the-counter medications.    She denies any prior abdominal surgeries, any significant past medical history, hematemesis, melena, anticoagulation use      Past History     Past Medical History:  Past Medical History:   Diagnosis Date    Anemia     Chronic knee pain     GERD (gastroesophageal reflux disease)        Past Surgical History:  No past surgical history on file.    Family History:  Family History   Problem Relation Age of Onset    Diabetes Maternal Grandmother        Social History:  Social History     Tobacco Use    Smoking status: Never    Smokeless tobacco: Never   Substance Use Topics    Alcohol use: Yes    Drug use: No       Medications:  Current Facility-Administered Medications   Medication Dose Route Frequency Provider Last Rate Last Admin    sodium chloride 0.9 % bolus 1,000 mL  1,000 mL IntraVENous Once Betsy Prieto PA-C 495.9 mL/hr at 05/13/24 1153 1,000 mL at 05/13/24 1153     Current Outpatient Medications   Medication Sig Dispense Refill    cephALEXin (KEFLEX) 500 MG capsule Take 1 capsule by mouth 2 times daily for 7 days 14 capsule 0    potassium chloride (KLOR-CON M) 10 MEQ extended release tablet

## 2024-05-15 LAB
BACTERIA SPEC CULT: ABNORMAL
CC UR VC: ABNORMAL
SERVICE CMNT-IMP: ABNORMAL